# Patient Record
Sex: FEMALE | Race: BLACK OR AFRICAN AMERICAN | Employment: OTHER | URBAN - METROPOLITAN AREA
[De-identification: names, ages, dates, MRNs, and addresses within clinical notes are randomized per-mention and may not be internally consistent; named-entity substitution may affect disease eponyms.]

---

## 2019-04-10 ENCOUNTER — HOSPITAL ENCOUNTER (EMERGENCY)
Age: 24
Discharge: HOME | End: 2019-04-10
Payer: COMMERCIAL

## 2019-04-10 VITALS
OXYGEN SATURATION: 100 % | DIASTOLIC BLOOD PRESSURE: 72 MMHG | RESPIRATION RATE: 16 BRPM | SYSTOLIC BLOOD PRESSURE: 120 MMHG | HEART RATE: 78 BPM | TEMPERATURE: 98.8 F | WEIGHT: 134 LBS

## 2019-04-10 DIAGNOSIS — H11.33 SUBCONJUNCTIVAL HEMORRHAGE, BILATERAL: Primary | ICD-10-CM

## 2019-04-10 PROCEDURE — 99282 EMERGENCY DEPT VISIT SF MDM: CPT

## 2019-04-10 PROCEDURE — 6370000000 HC RX 637 (ALT 250 FOR IP): Performed by: PHYSICIAN ASSISTANT

## 2019-04-10 RX ORDER — ERYTHROMYCIN 5 MG/G
OINTMENT OPHTHALMIC
Qty: 1 TUBE | Refills: 0 | Status: SHIPPED | OUTPATIENT
Start: 2019-04-10 | End: 2020-09-17

## 2019-04-10 RX ORDER — TETRACAINE HYDROCHLORIDE 5 MG/ML
2 SOLUTION OPHTHALMIC ONCE
Status: COMPLETED | OUTPATIENT
Start: 2019-04-10 | End: 2019-04-10

## 2019-04-10 RX ADMIN — FLUORESCEIN SODIUM 1 EACH: 0.6 STRIP OPHTHALMIC at 07:50

## 2019-04-10 RX ADMIN — TETRACAINE HYDROCHLORIDE 2 DROP: 5 SOLUTION OPHTHALMIC at 07:49

## 2019-04-10 ASSESSMENT — VISUAL ACUITY
OS: 20/70
OU: 20/30
OD: 20/50

## 2019-04-10 NOTE — ED NOTES
Pt alert and oriented to person, place, time and event. Pt answering questions appropriately, in full sentences without difficulty. Pt skin color is appropriate for patient and is warm and dry. Pt is able to ambulate without difficulty to ED exit. All questions and concerns were addressed and pt verbalized understanding. Pt was educated to follow instructions on DC paperwork or to return to ED if any new concerns arise. Pt currently has no new complaints and all treatments, provider orders for this visit are completed.       Wild Thorpe RN  04/10/19 1970

## 2019-04-10 NOTE — ED PROVIDER NOTES
[address]  [address] [facility] [address]  Dept: [phone]  Loc: [phone]    eMERGENCY dEPARTMENT eNCOUnter    Evaluated by the Advanced Practice Provider    CHIEF COMPLAINT    Chief Complaint   Patient presents with    Eye Problem       HPI    Amaya Mendieta is a 30 y.o. female who presents with bilateral eye redness. Onset was 2 days ago. The duration has been constant since the onset. The quality is redness over the sclera of both eyes. The patient denies associated pain. There are no aggravating or alleviating factors. The context was a spontaneous onset. Patient states that after arriving at work in a  2 days ago a coworker told him that his eyes look red. He denies any associated injury. He states that the redness has been gradually improving since the onset. REVIEW OF SYSTEMS    Eyes: see HPI  General: No fevers or chills  GI: No nausea or vomiting  Skin: No rash    PAST MEDICAL and SURGICAL HISTORY    Past Medical History:   Diagnosis Date    Asthma     Seizures ([facility] [address]     pt states since 2013     History reviewed. No pertinent surgical history.     CURRENT MEDICATIONS  (may include discharge medications prescribed in the ED)  Current Outpatient Rx   Medication Sig Dispense Refill    lamoTRIgine (LAMICTAL) 200 MG tablet Take 200 mg by mouth daily      folic acid (FOLVITE) 1 MG tablet Take 1 mg by mouth daily      LORazepam (ATIVAN) 1 MG tablet Take 1 tablet by mouth 3 times daily as needed for Anxiety 15 tablet 0       ALLERGIES    No Known Allergies    SOCIAL and FAMILY HISTORY    Social History     Socioeconomic History    Marital status: Single     Spouse name: None    Number of children: None    Years of education: None    Highest education level: None   Occupational History    None   Social Needs    Financial resource strain: None    Food insecurity:     Worry: None     Inability: dry skin, no obvious rash  Neurologic: Awake, alert and oriented, no slurred speech    ED COURSE & MEDICAL DECISION MAKING    See electronic medical records for medications prescribed    Differential diagnosis: Iritis, Uveitis, Conjunctivitis, Herpes Keratitis, Corneal Ulcer, Corneal Abrasion, Acute Angle Glaucoma, Orbital Cellulitis/Abscess, Periorbital/Preseptal Cellulitis, other. Patient is afebrile and nontoxic in appearance. No history of injury or foreign body. Patient is not a contact lens wearer. Slit-lamp exam unremarkable. Patient has minimal conjunctival injection, however I will prescribe erythromycin ointment to cover for possible infectious etiology. The patient was instructed to follow up as an outpatient in 1 day. The patient was instructed to return to the ED immediately for any new or worsening symptoms. The patient verbalized understanding. I have evaluated this patient. My supervising physician was available for consultation. FINAL IMPRESSION    1.  Subconjunctival hemorrhage, bilateral        PLAN  Discharge with medication and followup (see EMR)      (Please note that this note was completed with a voice recognition program.  Every attempt was made to edit the dictations, but inevitably there remain words that are mis-transcribed.)          Carlito Saxena  04/10/19 0800

## 2019-12-06 ENCOUNTER — HOSPITAL ENCOUNTER (EMERGENCY)
Age: 24
Discharge: HOME | End: 2019-12-06
Attending: EMERGENCY MEDICINE

## 2019-12-06 VITALS
SYSTOLIC BLOOD PRESSURE: 115 MMHG | WEIGHT: 141.31 LBS | BODY MASS INDEX: 25.04 KG/M2 | HEART RATE: 77 BPM | OXYGEN SATURATION: 100 % | TEMPERATURE: 98.5 F | RESPIRATION RATE: 20 BRPM | HEIGHT: 63 IN | DIASTOLIC BLOOD PRESSURE: 75 MMHG

## 2019-12-06 DIAGNOSIS — R56.9 SEIZURE (HCC): Primary | ICD-10-CM

## 2019-12-06 LAB
ANION GAP SERPL CALCULATED.3IONS-SCNC: 19 MMOL/L (ref 3–16)
BASOPHILS ABSOLUTE: 0 K/UL (ref 0–0.2)
BASOPHILS RELATIVE PERCENT: 0.6 %
BILIRUBIN URINE: NEGATIVE
BLOOD, URINE: ABNORMAL
BUN BLDV-MCNC: 10 MG/DL (ref 7–20)
CALCIUM SERPL-MCNC: 9.2 MG/DL (ref 8.3–10.6)
CHLORIDE BLD-SCNC: 105 MMOL/L (ref 99–110)
CLARITY: CLEAR
CO2: 15 MMOL/L (ref 21–32)
COLOR: YELLOW
CREAT SERPL-MCNC: 0.5 MG/DL (ref 0.6–1.1)
EOSINOPHILS ABSOLUTE: 0.1 K/UL (ref 0–0.6)
EOSINOPHILS RELATIVE PERCENT: 1.4 %
EPITHELIAL CELLS, UA: 1 /HPF (ref 0–5)
GFR AFRICAN AMERICAN: >60
GFR NON-AFRICAN AMERICAN: >60
GLUCOSE BLD-MCNC: 87 MG/DL (ref 70–99)
GLUCOSE URINE: NEGATIVE MG/DL
HCG(URINE) PREGNANCY TEST: NEGATIVE
HCT VFR BLD CALC: 39.2 % (ref 36–48)
HEMOGLOBIN: 12.2 G/DL (ref 12–16)
HYALINE CASTS: 0 /LPF (ref 0–8)
KETONES, URINE: NEGATIVE MG/DL
LEUKOCYTE ESTERASE, URINE: NEGATIVE
LYMPHOCYTES ABSOLUTE: 1.5 K/UL (ref 1–5.1)
LYMPHOCYTES RELATIVE PERCENT: 35.1 %
MCH RBC QN AUTO: 25.5 PG (ref 26–34)
MCHC RBC AUTO-ENTMCNC: 31.3 G/DL (ref 31–36)
MCV RBC AUTO: 81.5 FL (ref 80–100)
MICROSCOPIC EXAMINATION: YES
MONOCYTES ABSOLUTE: 0.4 K/UL (ref 0–1.3)
MONOCYTES RELATIVE PERCENT: 10.5 %
NEUTROPHILS ABSOLUTE: 2.2 K/UL (ref 1.7–7.7)
NEUTROPHILS RELATIVE PERCENT: 52.4 %
NITRITE, URINE: NEGATIVE
PDW BLD-RTO: 15.1 % (ref 12.4–15.4)
PH UA: 7 (ref 5–8)
PLATELET # BLD: 184 K/UL (ref 135–450)
PLATELET SLIDE REVIEW: ADEQUATE
PMV BLD AUTO: 10.6 FL (ref 5–10.5)
POTASSIUM REFLEX MAGNESIUM: 4.4 MMOL/L (ref 3.5–5.1)
PROTEIN UA: 30 MG/DL
RBC # BLD: 4.8 M/UL (ref 4–5.2)
RBC UA: 244 /HPF (ref 0–4)
SLIDE REVIEW: ABNORMAL
SODIUM BLD-SCNC: 139 MMOL/L (ref 136–145)
SPECIFIC GRAVITY UA: 1.02 (ref 1–1.03)
URINE REFLEX TO CULTURE: ABNORMAL
URINE TYPE: ABNORMAL
UROBILINOGEN, URINE: 0.2 E.U./DL
WBC # BLD: 4.2 K/UL (ref 4–11)
WBC UA: 1 /HPF (ref 0–5)

## 2019-12-06 PROCEDURE — 80048 BASIC METABOLIC PNL TOTAL CA: CPT

## 2019-12-06 PROCEDURE — 84703 CHORIONIC GONADOTROPIN ASSAY: CPT

## 2019-12-06 PROCEDURE — 99284 EMERGENCY DEPT VISIT MOD MDM: CPT

## 2019-12-06 PROCEDURE — 80175 DRUG SCREEN QUAN LAMOTRIGINE: CPT

## 2019-12-06 PROCEDURE — 6370000000 HC RX 637 (ALT 250 FOR IP): Performed by: EMERGENCY MEDICINE

## 2019-12-06 PROCEDURE — 81001 URINALYSIS AUTO W/SCOPE: CPT

## 2019-12-06 PROCEDURE — 85025 COMPLETE CBC W/AUTO DIFF WBC: CPT

## 2019-12-06 RX ORDER — LAMOTRIGINE 100 MG/1
TABLET ORAL
Qty: 105 TABLET | Refills: 0 | Status: SHIPPED | OUTPATIENT
Start: 2019-12-06 | End: 2021-06-03 | Stop reason: SDUPTHER

## 2019-12-06 RX ORDER — LAMOTRIGINE 100 MG/1
200 TABLET ORAL ONCE
Status: COMPLETED | OUTPATIENT
Start: 2019-12-06 | End: 2019-12-06

## 2019-12-06 RX ADMIN — LAMOTRIGINE 200 MG: 100 TABLET ORAL at 15:11

## 2019-12-06 ASSESSMENT — PAIN SCALES - GENERAL
PAINLEVEL_OUTOF10: 5
PAINLEVEL_OUTOF10: 8

## 2019-12-06 ASSESSMENT — PAIN DESCRIPTION - DESCRIPTORS
DESCRIPTORS: HEADACHE
DESCRIPTORS: HEADACHE

## 2019-12-06 ASSESSMENT — PAIN DESCRIPTION - PAIN TYPE
TYPE: ACUTE PAIN
TYPE: ACUTE PAIN

## 2019-12-06 ASSESSMENT — PAIN DESCRIPTION - FREQUENCY: FREQUENCY: CONTINUOUS

## 2019-12-06 ASSESSMENT — PAIN DESCRIPTION - ONSET: ONSET: SUDDEN

## 2019-12-06 ASSESSMENT — PAIN DESCRIPTION - LOCATION
LOCATION: HEAD
LOCATION: HEAD

## 2019-12-09 LAB — LAMOTRIGINE LEVEL: <0.9 UG/ML (ref 2.5–15)

## 2020-08-24 ENCOUNTER — HOSPITAL ENCOUNTER (EMERGENCY)
Age: 25
Discharge: HOME | End: 2020-08-24
Payer: MEDICARE

## 2020-08-24 VITALS
RESPIRATION RATE: 16 BRPM | HEIGHT: 60 IN | TEMPERATURE: 98 F | WEIGHT: 126.54 LBS | BODY MASS INDEX: 24.84 KG/M2 | SYSTOLIC BLOOD PRESSURE: 121 MMHG | OXYGEN SATURATION: 100 % | DIASTOLIC BLOOD PRESSURE: 78 MMHG | HEART RATE: 66 BPM

## 2020-08-24 LAB
ANION GAP SERPL CALCULATED.3IONS-SCNC: 12 MMOL/L (ref 3–16)
BASOPHILS ABSOLUTE: 0 K/UL (ref 0–0.2)
BASOPHILS RELATIVE PERCENT: 0.4 %
BUN BLDV-MCNC: 8 MG/DL (ref 7–20)
CALCIUM SERPL-MCNC: 9 MG/DL (ref 8.3–10.6)
CHLORIDE BLD-SCNC: 103 MMOL/L (ref 99–110)
CO2: 22 MMOL/L (ref 21–32)
CREAT SERPL-MCNC: 0.6 MG/DL (ref 0.6–1.1)
EOSINOPHILS ABSOLUTE: 0 K/UL (ref 0–0.6)
EOSINOPHILS RELATIVE PERCENT: 0.8 %
GFR AFRICAN AMERICAN: >60
GFR NON-AFRICAN AMERICAN: >60
GLUCOSE BLD-MCNC: 74 MG/DL (ref 70–99)
HCG QUALITATIVE: NEGATIVE
HCT VFR BLD CALC: 35.3 % (ref 36–48)
HEMOGLOBIN: 11.3 G/DL (ref 12–16)
LYMPHOCYTES ABSOLUTE: 1.4 K/UL (ref 1–5.1)
LYMPHOCYTES RELATIVE PERCENT: 29 %
MCH RBC QN AUTO: 25.1 PG (ref 26–34)
MCHC RBC AUTO-ENTMCNC: 32 G/DL (ref 31–36)
MCV RBC AUTO: 78.3 FL (ref 80–100)
MONOCYTES ABSOLUTE: 0.5 K/UL (ref 0–1.3)
MONOCYTES RELATIVE PERCENT: 11.1 %
NEUTROPHILS ABSOLUTE: 2.9 K/UL (ref 1.7–7.7)
NEUTROPHILS RELATIVE PERCENT: 58.7 %
PDW BLD-RTO: 16.2 % (ref 12.4–15.4)
PLATELET # BLD: 214 K/UL (ref 135–450)
PMV BLD AUTO: 9.8 FL (ref 5–10.5)
POTASSIUM REFLEX MAGNESIUM: 4 MMOL/L (ref 3.5–5.1)
RBC # BLD: 4.51 M/UL (ref 4–5.2)
SODIUM BLD-SCNC: 137 MMOL/L (ref 136–145)
WBC # BLD: 4.9 K/UL (ref 4–11)

## 2020-08-24 PROCEDURE — 80048 BASIC METABOLIC PNL TOTAL CA: CPT

## 2020-08-24 PROCEDURE — 99283 EMERGENCY DEPT VISIT LOW MDM: CPT

## 2020-08-24 PROCEDURE — 84703 CHORIONIC GONADOTROPIN ASSAY: CPT

## 2020-08-24 PROCEDURE — 85025 COMPLETE CBC W/AUTO DIFF WBC: CPT

## 2020-08-24 ASSESSMENT — ENCOUNTER SYMPTOMS
RESPIRATORY NEGATIVE: 1
GASTROINTESTINAL NEGATIVE: 1
BACK PAIN: 0

## 2020-08-24 NOTE — ED NOTES
D/C: Order noted for d/c. Pt confirmed d/c paperwork does have correct name. Discharge and education instructions reviewed with patient. Teach-back successful. Pt verbalized understanding and signed d/c papers. Pt denied questions at this time. No acute distress noted. Patient instructed to follow-up as noted - return to emergency department if symptoms worsen. Patient verbalized understanding. Discharged per EDMD with discharge instructions. Pt discharged to private vehicle. Patient stable upon departure. Thanked patient for choosing [facility] for care.      Kristal Zhou RN  08/24/20 9452

## 2020-08-24 NOTE — ED PROVIDER NOTES
[address] [facility]        Pt Name: Clifford Vega  MRN: [id number]  [place] 1995  Date of evaluation: 8/24/2020  Provider: VELASQUEZ Arrieta CNP  PCP: [facility]    Evaluation by SHANDA. My supervising physician was available for consultation. CHIEF COMPLAINT       Chief Complaint   Patient presents with    Other     pt with history of epilepsy. pt states her knees are \"jumping\". pt has been off her medicine recently but now has medication refilled. pt needs note for work       HISTORY OF PRESENT ILLNESS   (Location, Timing/Onset, Context/Setting, Quality, Duration, Modifying Factors, Severity, Associated Signs and Symptoms)  Note limiting factors. Clifford Vega is a 30 y.o. female that presents to the ED today with a vague complaint of knee spasms and weakness at the end of her work day. She states that she has not fallen. No knee pain or injury. States that at the end of her work shift, is a  at the [employer], she has some weakness in her knees and spasms. She states that it has caused her to miss work multiple times. She has no complaints at this time. This is been ongoing for several months, greater than 2 months at least according to the patient. Patient again has no complaints at this time. She came to the ED for mainly a work note. Nursing Notes were all reviewed and agreed with or any disagreements were addressed in the HPI. REVIEW OF SYSTEMS    (2-9 systems for level 4, 10 or more for level 5)     Review of Systems   Constitutional: Negative. Negative for chills, fatigue and fever. HENT: Negative. Respiratory: Negative. Cardiovascular: Negative. Gastrointestinal: Negative. Genitourinary: Negative. Musculoskeletal: Negative. Negative for arthralgias, back pain, gait problem, joint swelling and myalgias. Skin: Negative.     Allergic/Immunologic: Negative for immunocompromised state. Neurological: Negative. Psychiatric/Behavioral: Negative. All other systems reviewed and are negative. Positives and Pertinent negatives as per HPI. Except as noted above in the ROS, all other systems were reviewed and negative. PAST MEDICAL HISTORY     Past Medical History:   Diagnosis Date    Asthma     Seizures ([address]     pt states since 2013         SURGICAL HISTORY   History reviewed. No pertinent surgical history. CURRENTMEDICATIONS       Previous Medications    ERYTHROMYCIN (ROMYCIN) 5 MG/GM OPHTHALMIC OINTMENT    Apply to affected eye(s) 4 times a day for 7 days. Squeeze out a ribbon of ointment 1/2-inch long into the lower eyelid pocket without touching the tip of the tube to your eye. Look down and close your eyes for a few minutes. FOLIC ACID (FOLVITE) 1 MG TABLET    Take 1 mg by mouth daily    LAMOTRIGINE (LAMICTAL) 100 MG TABLET    Take 1.5 tablets by mouth every morning AND 2 tablets nightly. LORAZEPAM (ATIVAN) 1 MG TABLET    Take 1 tablet by mouth 3 times daily as needed for Anxiety         ALLERGIES     Patient has no known allergies. FAMILYHISTORY     History reviewed. No pertinent family history. SOCIAL HISTORY       Social History     Tobacco Use    Smoking status: Former Smoker     Types: Cigars    Smokeless tobacco: Never Used   Substance Use Topics    Alcohol use: No    Drug use: Yes     Types: Marijuana       SCREENINGS             PHYSICAL EXAM    (up to 7 for level 4, 8 or more for level 5)     ED Triage Vitals   BP Temp Temp Source Pulse Resp SpO2 Height Weight   08/24/20 1511 08/24/20 1511 08/24/20 1511 08/24/20 1511 08/24/20 1511 08/24/20 1511 08/24/20 1500 08/24/20 1500   127/75 97.9 °F (36.6 °C) Oral 69 17 100 % 5' (1.524 m) 126 lb 8.7 oz (57.4 kg)       Physical Exam  Vitals signs and nursing note reviewed. Constitutional:       General: She is not in acute distress. Appearance: Normal appearance.  She is well-developed. She is not ill-appearing, toxic-appearing or diaphoretic. Interventions: She is not intubated. HENT:      Head: Normocephalic and atraumatic. Eyes:      General:         Right eye: No discharge. Left eye: No discharge. Conjunctiva/sclera: Conjunctivae normal.   Neck:      Musculoskeletal: Full passive range of motion without pain, normal range of motion and neck supple. Vascular: No JVD. Trachea: No tracheal deviation. Cardiovascular:      Rate and Rhythm: Normal rate and regular rhythm. No extrasystoles are present. Chest Wall: PMI is not displaced. No thrill. Pulses: Normal pulses. No decreased pulses. Heart sounds: Normal heart sounds, S1 normal and S2 normal. Heart sounds not distant. No murmur. No friction rub. No gallop. Pulmonary:      Effort: Pulmonary effort is normal. No tachypnea, bradypnea, accessory muscle usage, prolonged expiration, respiratory distress or retractions. She is not intubated. Breath sounds: Normal breath sounds and air entry. No stridor, decreased air movement or transmitted upper airway sounds. No decreased breath sounds, wheezing, rhonchi or rales. Chest:      Chest wall: No mass, lacerations, deformity, swelling, tenderness, crepitus or edema. Abdominal:      General: Bowel sounds are normal. There is no distension or abdominal bruit. Palpations: Abdomen is soft. Abdomen is not rigid. There is no mass or pulsatile mass. Tenderness: There is no abdominal tenderness. There is no guarding or rebound. Negative signs include Nevarez's sign and McBurney's sign. Hernia: No hernia is present. Musculoskeletal: Normal range of motion. General: No tenderness or deformity. Right lower leg: No edema. Left lower leg: No edema. Lymphadenopathy:      Cervical: No cervical adenopathy. Skin:     General: Skin is warm and dry. Capillary Refill: Capillary refill takes 2 to 3 seconds. Coloration: Skin is not pale. Findings: No erythema or rash. Neurological:      General: No focal deficit present. Mental Status: She is alert and oriented to person, place, and time. She is not disoriented. GCS: GCS eye subscore is 4. GCS verbal subscore is 5. GCS motor subscore is 6. Cranial Nerves: No cranial nerve deficit, dysarthria or facial asymmetry. Sensory: No sensory deficit. Psychiatric:         Attention and Perception: Attention and perception normal.         Mood and Affect: Mood and affect normal.         Speech: Speech normal.         Behavior: Behavior normal. Behavior is cooperative. Thought Content: Thought content normal.         Cognition and Memory: Cognition and memory normal.         Judgment: Judgment normal.         DIAGNOSTIC RESULTS   LABS:    Labs Reviewed   CBC WITH AUTO DIFFERENTIAL - Abnormal; Notable for the following components:       Result Value    Hemoglobin 11.3 (*)     Hematocrit 35.3 (*)     MCV 78.3 (*)     MCH 25.1 (*)     RDW 16.2 (*)     All other components within normal limits    Narrative:     Performed at:  [address] [address]   Phone [phone]   BASIC METABOLIC PANEL W/ REFLEX TO MG FOR LOW K    Narrative:     Performed at:  [address] [address]   Phone [phone]   HCG, SERUM, QUALITATIVE    Narrative:     Performed at:  [address] [address]   Phone [phone]       All other labs were within normal range or not returned as of this dictation. EKG: All EKG's are interpreted by the Emergency Department Physician in the absence of a cardiologist.  Please see their note for interpretation of EKG.       RADIOLOGY:   Non-plain film images such as CT, Ultrasound and MRI are read by the radiologist. Plain radiographic images are visualized and preliminarily interpreted by the ED Provider with the below findings:        Interpretation per the Radiologist below, if available at the time of this note:    No orders to display     No results found. PROCEDURES   Unless otherwise noted below, none     Procedures    CRITICAL CARE TIME   N/A    CONSULTS:  None      EMERGENCY DEPARTMENT COURSE and DIFFERENTIAL DIAGNOSIS/MDM:   Vitals:    Vitals:    08/24/20 1500 08/24/20 1511   BP:  127/75   Pulse:  69   Resp:  17   Temp:  97.9 °F (36.6 °C)   TempSrc:  Oral   SpO2:  100%   Weight: 126 lb 8.7 oz (57.4 kg)    Height: 5' (1.524 m)        Patient was given the following medications:  Medications - No data to display        MDM: Patient seen and evaluated per myself. ED attending was available for consultation as needed. Patient is a 64-year-old female the presents to the ED today with vague complaints of ongoing bilateral knee weakness and spasming at the end of her work shifts for several months now. She is afebrile and hemodynamically stable. No complaints at this time, benign physical exam, see full physical exam as above. Differential diagnoses include generalized weakness, musculoskeletal fatigue, electrolyte derangement, pregnancy, medication side effect, other    As there is been no specific injury or trauma event or bony abnormality I do not believe that imaging is warranted at this point time. This appears to be a chronic issue, she will need follow-up with primary care and at this time is requesting a referral for primary care and neurology. Blood work shows no leukocytosis, stable anemia. No severe electrolyte derangements or JAMILA. hCG qualitative negative. She seems to have a chronic issue of what she describes as knee and leg spasms after working long shifts. I have a low suspicion for any emergent concerns.   She does need to follow-up with PCP, she does not currently have one and then 4 we will give her an urgent no PCP referral.  She is also requesting a referral back to [facility] neurology as she was dismissed from their practice due to noncompliance and no-shows to her appointments. I spoke to the patient stating that I will give her a referral but they did may not take her back as the patient given her history, therefore we will give her a referral to [facility] neurology for follow-up regarding her epilepsy. She is in agreement with this as well as the plan of discharge. Remained afebrile and hemodynamically stable throughout her entire ED course will be discharged home in stable condition. I estimate there is LOW risk for COMPARTMENT SYNDROME, DEEP VENOUS THROMBOSIS, SEPTIC ARTHRITIS, TENDON OR NEUROVASCULAR INJURY, thus I consider the discharge disposition reasonable. Makayla Sim and I have discussed the diagnosis and risks, and we agree with discharging home to follow-up with their primary doctor or the referral orthopedist. We also discussed returning to the Emergency Department immediately if new or worsening symptoms occur. We have discussed the symptoms which are most concerning (e.g., changing or worsening pain, numbness, weakness) that necessitate immediate return. Blood pressure 127/75, pulse 69, temperature 97.9 °F (36.6 °C), temperature source Oral, resp. rate 17, height 5' (1.524 m), weight 126 lb 8.7 oz (57.4 kg), SpO2 100 %. FINAL IMPRESSION      1.  Muscle spasm of both lower legs          DISPOSITION/PLAN   DISPOSITION Decision To Discharge 08/24/2020 05:31:52 PM      PATIENT REFERREDTO:  Christopher Bradciarra  [phone]  Schedule an appointment as soon as possible for a visit in 2 days      Neurology  [facility] Neurology at [address], [address] [place] [address]   [phone]  Schedule an appointment as soon as possible for a visit in 2 days      *[facility]  [address] [address]  [phone]    Schedule an appointment as soon as possible for a visit in 2 days  if UC will no longer take you as a patient d/t [facility] Emergency Department  [address] [address]  [phone]  Go to   As needed, If symptoms worsen      DISCHARGE MEDICATIONS:  New Prescriptions    No medications on file       DISCONTINUED MEDICATIONS:  Discontinued Medications    No medications on file              (Please note that portions of this note were completed with a voice recognition program.  Efforts were made to edit the dictations but occasionally words are mis-transcribed.)    VELASQUEZ Austin CNP (electronically signed)            VELASQUEZ Austin CNP  08/24/20 0959

## 2020-09-12 ENCOUNTER — HOSPITAL ENCOUNTER (EMERGENCY)
Age: 25
Discharge: HOME | End: 2020-09-12
Payer: MEDICARE

## 2020-09-12 ENCOUNTER — APPOINTMENT (OUTPATIENT)
Dept: GENERAL RADIOLOGY | Age: 25
End: 2020-09-12
Payer: MEDICARE

## 2020-09-12 VITALS
SYSTOLIC BLOOD PRESSURE: 118 MMHG | BODY MASS INDEX: 22.32 KG/M2 | OXYGEN SATURATION: 100 % | RESPIRATION RATE: 14 BRPM | WEIGHT: 126 LBS | DIASTOLIC BLOOD PRESSURE: 80 MMHG | TEMPERATURE: 97.8 F | HEART RATE: 77 BPM | HEIGHT: 63 IN

## 2020-09-12 PROCEDURE — 99283 EMERGENCY DEPT VISIT LOW MDM: CPT

## 2020-09-12 PROCEDURE — 73502 X-RAY EXAM HIP UNI 2-3 VIEWS: CPT

## 2020-09-12 PROCEDURE — 6370000000 HC RX 637 (ALT 250 FOR IP): Performed by: PHYSICIAN ASSISTANT

## 2020-09-12 RX ORDER — ACETAMINOPHEN 500 MG
1000 TABLET ORAL ONCE
Status: COMPLETED | OUTPATIENT
Start: 2020-09-12 | End: 2020-09-12

## 2020-09-12 RX ORDER — METHOCARBAMOL 500 MG/1
500 TABLET, FILM COATED ORAL 3 TIMES DAILY PRN
Qty: 20 TABLET | Refills: 0 | Status: SHIPPED | OUTPATIENT
Start: 2020-09-12 | End: 2020-09-22

## 2020-09-12 RX ORDER — LIDOCAINE 4 G/G
1 PATCH TOPICAL DAILY
Status: DISCONTINUED | OUTPATIENT
Start: 2020-09-12 | End: 2020-09-12 | Stop reason: HOSPADM

## 2020-09-12 RX ADMIN — ACETAMINOPHEN 1000 MG: 500 TABLET ORAL at 19:07

## 2020-09-12 ASSESSMENT — PAIN SCALES - GENERAL: PAINLEVEL_OUTOF10: 9

## 2020-09-12 NOTE — ED NOTES
Bed: B22-22  Expected date:   Expected time:   Means of arrival:   Comments:  Nish Barroso RN  09/12/20 7963
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
## 2020-09-12 NOTE — LETTER
[facility] Emergency Department  [place] [address]  Phone: [phone]  Fax: [phone]         September 12, 2020     Patient: Krysat Costa   YOB: 1995   Date of Visit: 9/12/2020       To Whom It May Concern:    Krysta Costa was seen and treated in our emergency department on 9/12/2020. The following work duties are recommended. She may return to work on 9/15/20    Treatment and work recommendations given by the emergency department are initial emergency measures only. May follow-up with primary care physician as an outpatient as needed.       Sincerely,        Wilson Hernandez PA-C        Signature:__________________________________

## 2020-09-12 NOTE — ED NOTES
RN to the bedside to medicate and discharge, pt states has not spoken with KIERRA peña to speak with her MD      Chris Shrestha, MELVA  09/12/20 3640

## 2020-09-17 ENCOUNTER — HOSPITAL ENCOUNTER (EMERGENCY)
Age: 25
Discharge: HOME | End: 2020-09-17
Payer: MEDICARE

## 2020-09-17 VITALS
DIASTOLIC BLOOD PRESSURE: 61 MMHG | RESPIRATION RATE: 16 BRPM | HEART RATE: 70 BPM | OXYGEN SATURATION: 100 % | SYSTOLIC BLOOD PRESSURE: 104 MMHG | TEMPERATURE: 98.3 F

## 2020-09-17 PROCEDURE — 99282 EMERGENCY DEPT VISIT SF MDM: CPT

## 2020-09-17 RX ORDER — CLINDAMYCIN PALMITATE HYDROCHLORIDE 75 MG/5ML
450 SOLUTION ORAL 3 TIMES DAILY
Qty: 900 ML | Refills: 0 | Status: SHIPPED | OUTPATIENT
Start: 2020-09-17 | End: 2020-09-27

## 2020-09-17 RX ORDER — ERYTHROMYCIN 5 MG/G
OINTMENT OPHTHALMIC
Qty: 1 TUBE | Refills: 0 | Status: SHIPPED | OUTPATIENT
Start: 2020-09-17 | End: 2022-10-07

## 2020-09-17 ASSESSMENT — PAIN DESCRIPTION - DESCRIPTORS: DESCRIPTORS: THROBBING

## 2020-09-17 ASSESSMENT — VISUAL ACUITY
OU: 20/40
OU: 1
OS: 20/50
OD: 20/70

## 2020-09-17 ASSESSMENT — PAIN DESCRIPTION - ONSET: ONSET: ON-GOING

## 2020-09-17 ASSESSMENT — ENCOUNTER SYMPTOMS
EYE PAIN: 1
EYE REDNESS: 0
PHOTOPHOBIA: 0
EYE DISCHARGE: 1

## 2020-09-17 ASSESSMENT — PAIN DESCRIPTION - PROGRESSION: CLINICAL_PROGRESSION: NOT CHANGED

## 2020-09-17 ASSESSMENT — PAIN DESCRIPTION - PAIN TYPE: TYPE: ACUTE PAIN

## 2020-09-17 ASSESSMENT — PAIN DESCRIPTION - LOCATION: LOCATION: EYE

## 2020-09-17 ASSESSMENT — PAIN DESCRIPTION - ORIENTATION: ORIENTATION: RIGHT

## 2020-09-17 ASSESSMENT — PAIN - FUNCTIONAL ASSESSMENT: PAIN_FUNCTIONAL_ASSESSMENT: PREVENTS OR INTERFERES SOME ACTIVE ACTIVITIES AND ADLS

## 2020-09-17 ASSESSMENT — PAIN SCALES - GENERAL: PAINLEVEL_OUTOF10: 9

## 2020-09-17 ASSESSMENT — PAIN DESCRIPTION - FREQUENCY: FREQUENCY: INTERMITTENT

## 2020-09-17 NOTE — ED PROVIDER NOTES
[address]      Pt Name: Reba Crawley  MRN: [id number]  [place] 1995  Date of evaluation: 9/17/2020  Provider: Devika Treviño PA-C    This patient was not seen and evaluated by the attending physician No att. providers found. CHIEF COMPLAINT       Chief Complaint   Patient presents with    Eye Problem     right eye, states a sye and crusty with pain x 3 days         HISTORYOF PRESENT ILLNESS  (Location/Symptom, Timing/Onset, Context/Setting, Quality, Duration, Modifying Factors, Severity.)   Reba Crawley is a 30 y.o. female who presents to the emergency department with right eye crusting and eyelid swelling. She started with a stye on the upper eyelid 3 days ago. She tried cool compresses without relief. Since then upper eyelid has become more swollen and painful and she has had some greenish drainage. Reports trouble opening her eye. No pain with extraocular movements. No blurred vision photophobia. She is supposed to wear glasses does not have them. Nursing Notes were reviewedand I agree. REVIEW OF SYSTEMS    (2-9 systems for level 4, 10 or more forlevel 5)     Review of Systems   Constitutional: Negative for fever. Eyes: Positive for pain and discharge. Negative for photophobia, redness and visual disturbance. Neurological: Negative for headaches. Except as noted above the remainder ofthe review of systems was reviewed and negative. PAST MEDICALHISTORY         Diagnosis Date    Asthma     Seizures ([facility] [address]     pt states since 2013       SURGICAL HISTORY     History reviewed. No pertinent surgical history.     CURRENT MEDICATIONS       Discharge Medication List as of 9/17/2020  3:38 PM      CONTINUE these medications which have NOT CHANGED    Details   methocarbamol (ROBAXIN) 500 MG tablet Take 1 tablet by mouth 3 times daily as needed (muscle spasms), Disp-20 tablet,R-0Print      lamoTRIgine (LAMICTAL) 100 MG tablet Take 1.5 tablets by mouth every morning AND 2 tablets nightly., Disp-105 tablet, [id number]      folic acid (FOLVITE) 1 MG tablet Take 1 mg by mouth daily      LORazepam (ATIVAN) 1 MG tablet Take 1 tablet by mouth 3 times daily as needed for Anxiety, Disp-15 tablet, R-0             ALLERGIES     Patient has no known allergies. FAMILY HISTORY     History reviewed. No pertinent family history. No family status information on file. SOCIAL HISTORY    reports that she has quit smoking. Her smoking use included cigars. She has never used smokeless tobacco. She reports current drug use. Drug: Marijuana. She reports that she does not drink alcohol. PHYSICAL EXAM    (up to 7 for level 4, 8 or more for level 5)     ED Triage Vitals [09/17/20 1425]   BP Temp Temp Source Pulse Resp SpO2 Height Weight   (!) 104/55 98.3 °F (36.8 °C) Oral 69 14 100 % -- --       Physical Exam  Vitals signs and nursing note reviewed. Constitutional:       General: She is not in acute distress. Appearance: She is well-developed. HENT:      Head: Normocephalic and atraumatic. Eyes:      General: Vision grossly intact. Gaze aligned appropriately. Right eye: Discharge (greenish) and hordeolum (left upper) present. No foreign body. Left eye: No foreign body. Extraocular Movements:      Right eye: Normal extraocular motion. Left eye: Normal extraocular motion. Conjunctiva/sclera:      Right eye: Right conjunctiva is not injected. Left eye: Left conjunctiva is not injected. Comments: The left upper eyelid is edematous and tender. No periorbital erythema or swelling. No pain on EOM's. No proptosis. Neck:      Musculoskeletal: Neck supple. Pulmonary:      Effort: Pulmonary effort is normal. No respiratory distress. Musculoskeletal: Normal range of motion. Skin:     General: Skin is warm and dry.    Neurological:      Mental Status: She is alert and oriented to person, place, and time.   Psychiatric:         Behavior: Behavior normal.            EMERGENCY DEPARTMENT COURSE and DIFFERENTIAL DIAGNOSIS/MDM:   Vitals:    Vitals:    09/17/20 1425 09/17/20 1545   BP: (!) 104/55 104/61   Pulse: 69 70   Resp: 14 16   Temp: 98.3 °F (36.8 °C) 98.3 °F (36.8 °C)   TempSrc: Oral Oral   SpO2: 100% 100%        I have evaluated this patient. My supervising physician was available for consultation. The patient has a stye but the upper eyelid is starting to get swollen and she may be developing an early periorbital cellulitis. I have no clinical concern for orbital cellulitis. She has no pain on extraocular movements, no proptosis. I recommended warm compresses, Romycin ophthalmic ointment and will prescribe clindamycin. The patient states she cannot take pills would like the antibiotic to be a liquid if possible. Discussed results, diagnosis and plan with patient and/or family. Questions addressed. Dispositionand follow-up agreed upon. Specific discharge instructions explained. The patient and/or family and I have discussed the diagnosis and risks, and we agree with discharging home to follow-up with their primary care,specialist or referral doctor. We also discussed returning to the Emergency Department immediately if new or worsening symptoms occur. We have discussed the symptoms which are most concerning that necessitate immediatereturn. PROCEDURES:  None    FINAL IMPRESSION      1. Hordeolum externum of right upper eyelid    2.  Pain and swelling of eyelid          DISPOSITION/PLAN   DISPOSITION Decision To Discharge 09/17/2020 03:13:04 PM      PATIENT REFERRED TO:  [address] [place] [address] Street    Schedule an appointment as soon as possible for a visit in 1 day        MEDICATIONS:  Discharge Medication List as of 9/17/2020  3:38 PM      START taking these medications    Details   clindamycin (CLEOCIN) 75 MG/5ML solution Take 30 mLs by mouth 3 times daily for 10 days, Disp-900 mL,R-0Print             (Please note that portions of this note were completed with a voice recognition program.  Efforts were made toedit the dictations but occasionally words are mis-transcribed.)    NUSRAT Murillo PA-C  09/17/20 1627

## 2020-12-06 ENCOUNTER — HOSPITAL ENCOUNTER (EMERGENCY)
Age: 25
Discharge: HOME | End: 2020-12-06
Payer: MEDICARE

## 2020-12-06 ENCOUNTER — APPOINTMENT (OUTPATIENT)
Dept: GENERAL RADIOLOGY | Age: 25
End: 2020-12-06
Payer: MEDICARE

## 2020-12-06 VITALS
RESPIRATION RATE: 14 BRPM | TEMPERATURE: 98.1 F | SYSTOLIC BLOOD PRESSURE: 122 MMHG | DIASTOLIC BLOOD PRESSURE: 74 MMHG | BODY MASS INDEX: 24.02 KG/M2 | HEART RATE: 70 BPM | OXYGEN SATURATION: 99 % | WEIGHT: 135.58 LBS

## 2020-12-06 LAB
BACTERIA: ABNORMAL /HPF
BILIRUBIN URINE: ABNORMAL
BLOOD, URINE: NEGATIVE
CLARITY: CLEAR
COLOR: YELLOW
EPITHELIAL CELLS, UA: ABNORMAL /HPF (ref 0–5)
GLUCOSE URINE: NEGATIVE MG/DL
HCG(URINE) PREGNANCY TEST: NEGATIVE
KETONES, URINE: NEGATIVE MG/DL
LEUKOCYTE ESTERASE, URINE: NEGATIVE
MICROSCOPIC EXAMINATION: YES
MUCUS: ABNORMAL /LPF
NITRITE, URINE: NEGATIVE
PH UA: 6 (ref 5–8)
PROTEIN UA: ABNORMAL MG/DL
RAPID INFLUENZA  B AGN: NEGATIVE
RAPID INFLUENZA A AGN: NEGATIVE
RBC UA: ABNORMAL /HPF (ref 0–4)
RENAL EPITHELIAL, UA: ABNORMAL /HPF (ref 0–1)
SPECIFIC GRAVITY UA: 1.03 (ref 1–1.03)
URINE REFLEX TO CULTURE: ABNORMAL
URINE TYPE: ABNORMAL
UROBILINOGEN, URINE: 0.2 E.U./DL
WBC UA: ABNORMAL /HPF (ref 0–5)

## 2020-12-06 PROCEDURE — 81001 URINALYSIS AUTO W/SCOPE: CPT

## 2020-12-06 PROCEDURE — 99283 EMERGENCY DEPT VISIT LOW MDM: CPT

## 2020-12-06 PROCEDURE — U0003 INFECTIOUS AGENT DETECTION BY NUCLEIC ACID (DNA OR RNA); SEVERE ACUTE RESPIRATORY SYNDROME CORONAVIRUS 2 (SARS-COV-2) (CORONAVIRUS DISEASE [COVID-19]), AMPLIFIED PROBE TECHNIQUE, MAKING USE OF HIGH THROUGHPUT TECHNOLOGIES AS DESCRIBED BY CMS-2020-01-R: HCPCS

## 2020-12-06 PROCEDURE — U0002 COVID-19 LAB TEST NON-CDC: HCPCS

## 2020-12-06 PROCEDURE — 71045 X-RAY EXAM CHEST 1 VIEW: CPT

## 2020-12-06 PROCEDURE — 87804 INFLUENZA ASSAY W/OPTIC: CPT

## 2020-12-06 PROCEDURE — 84703 CHORIONIC GONADOTROPIN ASSAY: CPT

## 2020-12-06 RX ORDER — FLUTICASONE PROPIONATE 50 MCG
1 SPRAY, SUSPENSION (ML) NASAL DAILY
Qty: 1 BOTTLE | Refills: 0 | Status: SHIPPED | OUTPATIENT
Start: 2020-12-06

## 2020-12-06 ASSESSMENT — PAIN SCALES - GENERAL
PAINLEVEL_OUTOF10: 0
PAINLEVEL_OUTOF10: 0

## 2020-12-06 NOTE — ED PROVIDER NOTES
[address] [facility]  [address] [address]  Dept: [phone]  Loc: [address] ENCOUNTER        This patient was not seen or evaluated by the attending physician. I evaluated this patient, the attending physician was available for consultation. CHIEF COMPLAINT    Chief Complaint   Patient presents with    Covid Testing     stuff nose       HPI    Lorrie Bullard is a 30 y.o. female who presents with nasal congestion with no other associated symptoms. Onset was 2 days ago. Sister has very similar symptoms, and her sister works at a school/. The duration has been constant since the onset. There are no alleviating factors. She denies any pain, a or generalized body aches. Denies any fevers or chills. Denies being diabetic or immunocompromise. Came to the ED for further evaluation and treatment. REVIEW OF SYSTEMS    Pulmonary: no sore throat, see HPI, no hemoptysis  General: no fever, no myalgia  GI: no nausea, no vomiting  All other systems reviewed and are negative. PAST MEDICAL AND SURGICAL HISTORY    Past Medical History:   Diagnosis Date    Asthma     Seizures ([facility] [address]     pt states since 2013     History reviewed. No pertinent surgical history. CURRENT MEDICATIONS  (may include discharge medications prescribed in the ED)  Current Outpatient Rx   Medication Sig Dispense Refill    fluticasone (FLONASE) 50 MCG/ACT nasal spray 1 spray by Each Nostril route daily 1 Bottle 0    erythromycin (ROMYCIN) 5 MG/GM ophthalmic ointment Apply to the lower eyelid margin 4 times daily for 5 days. 1 Tube 0    lamoTRIgine (LAMICTAL) 100 MG tablet Take 1.5 tablets by mouth every morning AND 2 tablets nightly.  256 tablet 0    folic acid (FOLVITE) 1 MG tablet Take 1 mg by mouth daily      LORazepam (ATIVAN) 1 MG tablet Take 1 tablet by mouth 3 times daily as needed for Anxiety 15 tablet 0 ALLERGIES    No Known Allergies    FAMILY AND SOCIAL HISTORY    History reviewed. No pertinent family history.   Social History     Socioeconomic History    Marital status: Single     Spouse name: None    Number of children: None    Years of education: None    Highest education level: None   Occupational History    None   Social Needs    Financial resource strain: None    Food insecurity     Worry: None     Inability: None    Transportation needs     Medical: None     Non-medical: None   Tobacco Use    Smoking status: Former Smoker     Types: Cigars    Smokeless tobacco: Never Used   Substance and Sexual Activity    Alcohol use: No    Drug use: Yes     Types: Marijuana     Comment: daily    Sexual activity: None   Lifestyle    Physical activity     Days per week: None     Minutes per session: None    Stress: None   Relationships    Social connections     Talks on phone: None     Gets together: None     Attends [employer] service: None     Active member of club or organization: None     Attends meetings of clubs or organizations: None     Relationship status: None    Intimate partner violence     Fear of current or ex partner: None     Emotionally abused: None     Physically abused: None     Forced sexual activity: None   Other Topics Concern    None   Social History Narrative    None       PHYSICAL EXAM    VITAL SIGNS: /78   Pulse 74   Temp 97.7 °F (36.5 °C) (Oral)   Resp 16   Wt 135 lb 9.3 oz (61.5 kg)   LMP 12/02/2020   SpO2 99%   BMI 24.02 kg/m²   Constitutional:  Well developed, well nourished, no acute distress  Eyes: Sclera nonicteric, conjunctiva normal   Ears: external ears normal  Neck: Supple, no meningismus   Respiratory:  Lungs clear to auscultation bilaterally, no retractions  Cardiovascular: regular rate, regular rhythm, no murmurs, rubs or gallops  GI: soft, nontender, nondistended, BS x4  Neurologic: Awake, alert and oriented, no slurred speech  Integument: Skin is warm and dry, no rash, moist mucus membranes      RADIOLOGY/PROCEDURES    XR CHEST PORTABLE   Final Result   No evidence of pneumonia               LABS  Results for orders placed or performed during the hospital encounter of 12/06/20   Rapid influenza A/B antigens    Specimen: Nasopharyngeal   Result Value Ref Range    Rapid Influenza A Ag Negative Negative    Rapid Influenza B Ag Negative Negative   Urinalysis Reflex to Culture    Specimen: Urine, clean catch   Result Value Ref Range    Color, UA YELLOW Straw/Yellow    Clarity, UA Clear Clear    Glucose, Ur Negative Negative mg/dL    Bilirubin Urine SMALL (A) Negative    Ketones, Urine Negative Negative mg/dL    Specific Gravity, UA 1.028 1.005 - 1.030    Blood, Urine Negative Negative    pH, UA 6.0 5.0 - 8.0    Protein, UA TRACE (A) Negative mg/dL    Urobilinogen, Urine 0.2 <2.0 E.U./dL    Nitrite, Urine Negative Negative    Leukocyte Esterase, Urine Negative Negative    Microscopic Examination YES     Urine Type Other    Pregnancy, Urine   Result Value Ref Range    HCG(Urine) Pregnancy Test Negative Detects HCG level >20 MIU/mL         ED COURSE & MEDICAL DECISION MAKING    See chart for details of medications given. Vitals:    12/06/20 1532 12/06/20 1534 12/06/20 1535   BP:  116/78    Pulse: 74     Resp: 16     Temp: 97.7 °F (36.5 °C)     TempSrc: Oral     SpO2: 99%     Weight:   135 lb 9.3 oz (61.5 kg)           Differential diagnoses: Influenza, Other viral illness, Meningitis, Group A strep, Airway Obstruction, Pneumonia, Hypoxemia, Dehydration, COVID-19, other. Patient presenting with complaints of nasal congestion, no fevers or chills. Is afebrile, hemodynamically stable and nontoxic in appearance here in the ED. Rapid influenza swab negative. Chest x-ray as above. Lungs clear, patient is satting well on room air. Chest x-ray is read by the radiologist as above.     Patient scores indicate a low clinical risk of decompensation within 24 hours for Covid-19. Clinically, the patient's symptoms are consistent with viral illness. I will prescribe symptomatic medications. I estimate there is LOW risk for EPIGLOTTITIS, PNEUMONIA, MENINGITIS, OR URINARY TRACT INFECTION, thus I consider the discharge disposition reasonable. Also, there is no evidence or peritonitis, sepsis, or toxicity. Smith Carlin and I have discussed the diagnosis and risks, and we agree with discharging home to follow-up with their primary doctor. We also discussed returning to the Emergency Department immediately if new or worsening symptoms occur. We have discussed the symptoms which are most concerning (e.g., changing or worsening pain, trouble swallowing or breating, neck stiffness, fever) that necessitate immediate return. The patient was instructed to follow up as an outpatient in 2 days. The patient was instructed to return to the ED immediately for any new or worsening symptoms. The patient verbalized understanding. Discharge Vital Signs:  Blood pressure 116/78, pulse 74, temperature 97.7 °F (36.5 °C), temperature source Oral, resp. rate 16, weight 135 lb 9.3 oz (61.5 kg), last menstrual period 12/02/2020, SpO2 99 %. FINAL IMPRESSION    1. COVID-19 virus test result unknown    2.  Nasal congestion        PLAN  Outpatient medications, followup, and discharge instructions (see EMR)      (Please note that this note was completed with a voice recognition program.  Every attempt was made to edit the dictations, but inevitably there remain words that are mis-transcribed.)              Shannon Christian, VELASQUEZ - DYLAN  12/06/20 0461

## 2020-12-06 NOTE — ED NOTES
Discharge and education instructions reviewed. Patient verbalized understanding, teach-back successful. Patient denied questions at this time. No acute distress noted. Patient instructed to follow-up as noted - return to emergency department if symptoms worsen. Patient verbalized understanding. Discharged per EDMD with discharge instructions.           Roderick Sloan RN  12/06/20 7867

## 2020-12-07 ENCOUNTER — CARE COORDINATION (OUTPATIENT)
Dept: CARE COORDINATION | Age: 25
End: 2020-12-07

## 2020-12-07 LAB — SARS-COV-2, PCR: DETECTED

## 2020-12-07 NOTE — CARE COORDINATION
Patient contacted regarding [id number] diagnosis\". Discussed COVID-19 related testing which was available at this time. Test results were positive. Patient informed of results, if available? Yes    Care Transition Nurse/ Ambulatory Care Manager contacted the patient by telephone to perform post discharge assessment. Call within 2 business days of discharge: Yes. Verified name and  with patient as identifiers. Provided introduction to self, and explanation of the CTN/ACM role, and reason for call due to risk factors for infection and/or exposure to COVID-19. Patient stated she has NO SYMPTOMS and that this ACM ws the third person to call her re results today. She asked that she have Scott Perez for oversight, and Inquired as to whether she needed help w scheduling a follow up appt with her provider. Pt declined and hung up  no further outreach planned based on severity of symptoms and risk factors.

## 2020-12-07 NOTE — RESULT ENCOUNTER NOTE
Patient's positive result has been appropriately evaluated by the provider pool. Patient was contacted and notified of the result. The patient was called for notification of a POSITIVE test result for COVID-19. The following information was given to the patient:    The COVID-19 test result was positive  Treatment of coronavirus does not require an antibiotic  Remain isolated for 10 days since symptoms first appeared AND At least 3 days have passed after recovery (Recovery is defined as resolution of fever without the use of fever-reducing medications with progressive improvement or resolution of other symptoms). Wash hands often with soap and water for at least 20 seconds or alternatively use hand  with at least 60% alcohol content  Cover coughs and sneezes  Wear a mask when around others if possible  Clean all high-touch surfaces every day, such as doorknobs and cellphones  Continually monitor symptoms. Contact a medical provider if symptoms are worsening, such as difficulty breathing. For additional information, please visit the Centers for Disease Control and Prevention ([email or web address].

## 2021-06-03 ENCOUNTER — HOSPITAL ENCOUNTER (EMERGENCY)
Age: 26
Discharge: HOME | End: 2021-06-03
Attending: EMERGENCY MEDICINE
Payer: MEDICARE

## 2021-06-03 VITALS
OXYGEN SATURATION: 100 % | TEMPERATURE: 98.5 F | SYSTOLIC BLOOD PRESSURE: 113 MMHG | RESPIRATION RATE: 22 BRPM | HEART RATE: 75 BPM | DIASTOLIC BLOOD PRESSURE: 77 MMHG | WEIGHT: 138.45 LBS | BODY MASS INDEX: 24.53 KG/M2

## 2021-06-03 DIAGNOSIS — R56.9 SEIZURE (HCC): Primary | ICD-10-CM

## 2021-06-03 DIAGNOSIS — Z87.898 HISTORY OF SEIZURES: ICD-10-CM

## 2021-06-03 DIAGNOSIS — Z91.14 NON COMPLIANCE W MEDICATION REGIMEN: ICD-10-CM

## 2021-06-03 LAB
ANION GAP SERPL CALCULATED.3IONS-SCNC: 23 MMOL/L (ref 3–16)
ANISOCYTOSIS: ABNORMAL
BASOPHILS ABSOLUTE: 0.1 K/UL (ref 0–0.2)
BASOPHILS RELATIVE PERCENT: 1 %
BUN BLDV-MCNC: 8 MG/DL (ref 7–20)
CALCIUM SERPL-MCNC: 8.9 MG/DL (ref 8.3–10.6)
CHLORIDE BLD-SCNC: 100 MMOL/L (ref 99–110)
CO2: 12 MMOL/L (ref 21–32)
CREAT SERPL-MCNC: 0.8 MG/DL (ref 0.6–1.1)
EKG ATRIAL RATE: 91 BPM
EKG DIAGNOSIS: NORMAL
EKG P AXIS: 59 DEGREES
EKG P-R INTERVAL: 168 MS
EKG Q-T INTERVAL: 368 MS
EKG QRS DURATION: 88 MS
EKG QTC CALCULATION (BAZETT): 452 MS
EKG R AXIS: 60 DEGREES
EKG T AXIS: 40 DEGREES
EKG VENTRICULAR RATE: 91 BPM
EOSINOPHILS ABSOLUTE: 0.1 K/UL (ref 0–0.6)
EOSINOPHILS RELATIVE PERCENT: 1.9 %
GFR AFRICAN AMERICAN: >60
GFR NON-AFRICAN AMERICAN: >60
GLUCOSE BLD-MCNC: 130 MG/DL (ref 70–99)
HCG QUALITATIVE: NEGATIVE
HCT VFR BLD CALC: 40.4 % (ref 36–48)
HEMOGLOBIN: 12.4 G/DL (ref 12–16)
LYMPHOCYTES ABSOLUTE: 2.9 K/UL (ref 1–5.1)
LYMPHOCYTES RELATIVE PERCENT: 46.4 %
MCH RBC QN AUTO: 24.1 PG (ref 26–34)
MCHC RBC AUTO-ENTMCNC: 30.6 G/DL (ref 31–36)
MCV RBC AUTO: 78.8 FL (ref 80–100)
MICROCYTES: ABNORMAL
MONOCYTES ABSOLUTE: 0.6 K/UL (ref 0–1.3)
MONOCYTES RELATIVE PERCENT: 9.3 %
NEUTROPHILS ABSOLUTE: 2.6 K/UL (ref 1.7–7.7)
NEUTROPHILS RELATIVE PERCENT: 41.4 %
PDW BLD-RTO: 18.6 % (ref 12.4–15.4)
PLATELET # BLD: 192 K/UL (ref 135–450)
PMV BLD AUTO: 10 FL (ref 5–10.5)
POIKILOCYTES: ABNORMAL
POTASSIUM REFLEX MAGNESIUM: 4.4 MMOL/L (ref 3.5–5.1)
RBC # BLD: 5.13 M/UL (ref 4–5.2)
SLIDE REVIEW: 1
SODIUM BLD-SCNC: 135 MMOL/L (ref 136–145)
WBC # BLD: 6.2 K/UL (ref 4–11)

## 2021-06-03 PROCEDURE — 93005 ELECTROCARDIOGRAM TRACING: CPT | Performed by: EMERGENCY MEDICINE

## 2021-06-03 PROCEDURE — 36415 COLL VENOUS BLD VENIPUNCTURE: CPT

## 2021-06-03 PROCEDURE — 85025 COMPLETE CBC W/AUTO DIFF WBC: CPT

## 2021-06-03 PROCEDURE — 80175 DRUG SCREEN QUAN LAMOTRIGINE: CPT

## 2021-06-03 PROCEDURE — 80048 BASIC METABOLIC PNL TOTAL CA: CPT

## 2021-06-03 PROCEDURE — 6370000000 HC RX 637 (ALT 250 FOR IP): Performed by: EMERGENCY MEDICINE

## 2021-06-03 PROCEDURE — 99285 EMERGENCY DEPT VISIT HI MDM: CPT

## 2021-06-03 PROCEDURE — 84703 CHORIONIC GONADOTROPIN ASSAY: CPT

## 2021-06-03 PROCEDURE — 93010 ELECTROCARDIOGRAM REPORT: CPT | Performed by: INTERNAL MEDICINE

## 2021-06-03 RX ORDER — ACETAMINOPHEN 325 MG/1
650 TABLET ORAL ONCE
Status: COMPLETED | OUTPATIENT
Start: 2021-06-03 | End: 2021-06-03

## 2021-06-03 RX ORDER — LAMOTRIGINE 100 MG/1
TABLET ORAL
Qty: 322 TABLET | Refills: 1 | Status: SHIPPED | OUTPATIENT
Start: 2021-06-03 | End: 2022-10-07

## 2021-06-03 RX ORDER — IBUPROFEN 400 MG/1
400 TABLET ORAL ONCE
Status: COMPLETED | OUTPATIENT
Start: 2021-06-03 | End: 2021-06-03

## 2021-06-03 RX ADMIN — IBUPROFEN 400 MG: 400 TABLET ORAL at 12:06

## 2021-06-03 RX ADMIN — LAMOTRIGINE 150 MG: 100 TABLET ORAL at 11:28

## 2021-06-03 RX ADMIN — ACETAMINOPHEN 650 MG: 325 TABLET ORAL at 12:06

## 2021-06-03 ASSESSMENT — PAIN DESCRIPTION - PAIN TYPE
TYPE: ACUTE PAIN
TYPE: ACUTE PAIN

## 2021-06-03 ASSESSMENT — PAIN DESCRIPTION - LOCATION
LOCATION: HEAD
LOCATION: HEAD

## 2021-06-03 ASSESSMENT — PAIN SCALES - GENERAL
PAINLEVEL_OUTOF10: 8

## 2021-06-03 ASSESSMENT — PAIN DESCRIPTION - DESCRIPTORS
DESCRIPTORS: ACHING
DESCRIPTORS: ACHING

## 2021-06-03 NOTE — ED NOTES
Pt ambulated to bathroom with no c/o increased dizziness and with a steady gait.       Kym Randolph RN  06/03/21 7162

## 2021-06-03 NOTE — ED TRIAGE NOTES
Pt arrived to dept via EMS from home. Pt c/o a witnessed seizure in her sleep lasting approx 1 minute. Pt postictal upon arrival but reports taking lamotrigine twice a day. Pt states that she took her medicine last night but did not take it this morning. Pt awake, alert and oriented x 3. Skin warm and dry/normal color for ethnicity. Resp easy and unlabored. Pt placed in gown and on cardiac monitor. Call light in reach. Will continue to monitor.

## 2021-06-03 NOTE — ED NOTES
Discharge and education instructions reviewed. Patient verbalized understanding, teach-back successful. Patient denied questions at this time. No acute distress noted. Patient instructed to follow-up as noted - return to emergency department if symptoms worsen. Patient verbalized understanding. Discharged per EDMD with discharge instructions.           Jennifer Stover RN  06/03/21 8296

## 2021-06-03 NOTE — ED NOTES
Bed: B-34  Expected date: 6/3/21  Expected time: 9:12 AM  Means of arrival: [facility]  Comments:  26F seizure     Ramya Ohara [facility]  06/03/21 8901

## 2021-06-03 NOTE — ED PROVIDER NOTES
[address]      Pt Name: Benji Miller  MRN: [id number]  [place] 1995  Date of evaluation: 6/3/2021  Provider: Viola Gomes MD    CHIEF COMPLAINT     Per family in the room \"she had a seizure\"  HISTORY OF PRESENT ILLNESS  (Location/Symptom, Timing/Onset,Context/Setting, Quality, Duration, Modifying Factors, Severity). Note limiting factors. Benji Miller is a 27 y.o. female who presents to the emergency department secondary to concern for seizure she had this morning. Girlfriend at the bedside states the seizure lasted approximately 3 minutes. She states it was her normal seizure, she will let beforehand, head whole body shaking. She denies anything new about the seizure. She denies any new medications. Denies any known alcohol use. No known sick contacts, patient has been healthy recently, no known trauma. Patient on arrival is postictal, able to provide little information though is confused. Past medical history noted below, significant for asthma and seizures since 2013. Review of medical record shows she is on lamotrigine. Not a smoker. Aside from what is stated above denies any other symptoms or modifying factors. Nursing Notes reviewed. REVIEW OF SYSTEMS  (2-9 systems for level 4, 10 or more for level 5)   Review of Systems limited due to patient postictal.  PAST MEDICAL HISTORY     Past Medical History:   Diagnosis Date    Asthma     Seizures ([facility] [address]     pt states since 2013       SURGICALHISTORY     History reviewed. No pertinent surgical history. CURRENT MEDICATIONS       Previous Medications    ERYTHROMYCIN (ROMYCIN) 5 MG/GM OPHTHALMIC OINTMENT    Apply to the lower eyelid margin 4 times daily for 5 days.     FLUTICASONE (FLONASE) 50 MCG/ACT NASAL SPRAY    1 spray by Each Nostril route daily    FOLIC ACID (FOLVITE) 1 MG TABLET    Take 1 mg by mouth daily    LORAZEPAM (ATIVAN) 1 MG TABLET    Take 1 tablet by mouth 3 times daily as needed for Anxiety      ALLERGIES     Patient has no known allergies. FAMILY HISTORY     History reviewed. No pertinent family history. SOCIAL HISTORY       Social History     Socioeconomic History    Marital status: Single     Spouse name: None    Number of children: None    Years of education: None    Highest education level: None   Occupational History    None   Tobacco Use    Smoking status: Former Smoker     Types: Cigars    Smokeless tobacco: Never Used   Vaping Use    Vaping Use: Never used   Substance and Sexual Activity    Alcohol use: No    Drug use: Yes     Types: Marijuana     Comment: daily    Sexual activity: None   Other Topics Concern    None   Social History Narrative    None     Social Determinants of Health     Financial Resource Strain:     Difficulty of Paying Living Expenses:    Food Insecurity:     Worried About Running Out of Food in the Last Year:     Ran Out of Food in the Last Year:    Transportation Needs:     Lack of Transportation (Medical):  Lack of Transportation (Non-Medical):    Physical Activity:     Days of Exercise per Week:     Minutes of Exercise per Session:    Stress:     Feeling of Stress :    Social Connections:     Frequency of Communication with Friends and Family:     Frequency of Social Gatherings with Friends and Family:     Attends [employer] Services:     Active Member of Clubs or Organizations:     Attends Club or Organization Meetings:     Marital Status:    Intimate Partner Violence:     Fear of Current or Ex-Partner:     Emotionally Abused:     Physically Abused:     Sexually Abused:      SCREENINGS         PHYSICAL EXAM  (up to 7 for level 4, 8 or more for level 5)   INITIAL VITALS: BP: 134/72, Temp: 98.5 °F (36.9 °C), Pulse: 100, Resp: 23, SpO2: 99 %   Physical Exam  Constitutional:       Appearance: She is not ill-appearing or toxic-appearing. HENT:      Head: Normocephalic and atraumatic. Right Ear: External ear normal.      Left Ear: External ear normal.      Nose: Nose normal.      Mouth/Throat:      Comments: Left side tongue biting, no active bleeding  Eyes:      General: No scleral icterus. Right eye: No discharge. Left eye: No discharge. Extraocular Movements: Extraocular movements intact. Conjunctiva/sclera: Conjunctivae normal.      Pupils: Pupils are equal, round, and reactive to light. Neck:      Trachea: No tracheal deviation. Cardiovascular:      Rate and Rhythm: Normal rate. Pulses: Normal pulses. Comments: Rate 90s on the monitor  Pulmonary:      Effort: Pulmonary effort is normal. No respiratory distress. Abdominal:      General: There is no distension. Tenderness: There is no abdominal tenderness. There is no guarding or rebound. Musculoskeletal:         General: Normal range of motion. Cervical back: Normal range of motion. No rigidity. Right lower leg: No edema. Left lower leg: No edema. Skin:     General: Skin is warm and dry. Capillary Refill: Capillary refill takes less than 2 seconds. Neurological:      General: No focal deficit present. Mental Status: She is confused. Cranial Nerves: Cranial nerves are intact. Sensory: Sensation is intact. Comments: Knows her name, date of birth, that she is at a hospital.  Difficulty with month and year initially. Moves all extremities spontaneously. Can follow simple commands. Gait deferred secondary to safety       DIAGNOSTIC RESULTS   EKG: interpreted by the Emergency Department Physician who either signs or Co-signs this chart in the absence of a cardiologist.  Indication: Seizure  Interpretation: Rate 91, rhythm sinus, axis normal.  WI/QRS/QTc within normal limits. No T/ST changes consistent with acute ischemia. No prior EKG available for comparison.     RADIOLOGY:   Interpretation per Radiologist below, if available at the time of this note:  No orders to display     LABS:  Labs Reviewed   CBC WITH AUTO DIFFERENTIAL - Abnormal; Notable for the following components:       Result Value    MCV 78.8 (*)     MCH 24.1 (*)     MCHC 30.6 (*)     RDW 18.6 (*)     Anisocytosis Occasional (*)     Microcytes Occasional (*)     Poikilocytes Occasional (*)     All other components within normal limits    Narrative:     Performed at:  [address] [address]   Phone [phone]   BASIC METABOLIC PANEL W/ REFLEX TO MG FOR LOW K - Abnormal; Notable for the following components:    Sodium 135 (*)     CO2 12 (*)     Anion Gap 23 (*)     Glucose 130 (*)     All other components within normal limits    Narrative:     Audi Aleks tel. [phone],  Chemistry results called to and read back by Natividad Baron RN, 06/03/2021  10:11, by Merline Fusi  Performed at:  [address] [address]   Phone [phone]   HCG, SERUM, QUALITATIVE    Narrative:     Performed at:  [address] [address]   Phone [phone]   LAMOTRIGINE LEVEL   POCT GLUCOSE       EMERGENCY DEPARTMENT COURSE and DIFFERENTIAL DIAGNOSIS/MDM:   Patient was given the following medications:  Orders Placed This Encounter   Medications    lamoTRIgine (LAMICTAL) tablet 150 mg    lamoTRIgine (LAMICTAL) 100 MG tablet     Sig: Take 1.5 tablets by mouth every morning AND 2 tablets nightly. Dispense:  322 tablet     Refill:  1    ibuprofen (ADVIL;MOTRIN) tablet 400 mg    acetaminophen (TYLENOL) tablet 650 mg     CONSULTS:  None    INITIAL VITALS: BP: 134/72, Temp: 98.5 °F (36.9 °C), Pulse: 100, Resp: 23, SpO2: 99 %   RECENT VITALS:  BP: 113/77,Temp: 98.5 °F (36.9 °C), Pulse: 75, Resp: 22, SpO2: 100 %     Osmin Carrier is a 27 y.o. female who presents to the emergency department secondary to concern for seizure. She has a history of seizures. On arrival she is postictal.  Her initial exam is noted above, no focal neurologic deficits are noted. Per family at the bedside no recent systemic symptoms concerning for infection, no trauma. A peripheral IV was placed, labs were ordered along with an EKG. After further discussion with patient when she was more awake she does admit to missing medication for her seizures due to running low. She states she has a primary care but she has not seen them in the \"a long time\". She states her lamotrigine medication (which she is able to tell me the correct dosage for) has no refills and she will run out in a few days. EKG unremarkable. Labs consistent with recent seizure. On reassessment she reports feeling significantly better. She does have a mild headache which she states happens after she has a seizure. She was ordered Tylenol. She is able to walk to the restroom with a steady gait. Family at bedside states she is at her mental baseline. We reiterated importance of following up with primary care and since she does not currently have when she sees actively (the one in the system she has not seen in many years) she was given a referral to a new one. Prior to discharge we discussed return precautions as well as seizure precautions including no driving, no showering when home alone or going up on ladders etc. which she stated she had her before and expressed understanding of. She does not currently drive regardless she says. FINAL IMPRESSION      1. Seizure ([address]    2. History of seizures    3.  Non compliance w medication regimen        DISPOSITION/PLAN   DISPOSITION Decision To Discharge 06/03/2021 10:16:56 AM      PATIENT REFERRED TO:  Christopher Dawson  [phone]  Schedule an appointment as soon as possible for a visit   For follow up appointment to set up primary care    [facility] - Neurology  [place]

## 2021-06-05 LAB — LAMOTRIGINE LEVEL: 1.7 UG/ML (ref 2.5–15)

## 2022-07-26 NOTE — ED TRIAGE NOTES
Pt c/o eye redness for 2 days. Pt has redness on lateral side of left eye and medial side of right eye. Pt denies any trauma. Pt is currently alert and oriented x 4. Pt is answering questions approprietly, in full sentences without difficulty or trouble breathing. Pt is currently resting in bed in supine position. Pt VS are as noted.
No

## 2022-10-07 ENCOUNTER — HOSPITAL ENCOUNTER (EMERGENCY)
Age: 27
Discharge: HOME | End: 2022-10-07
Attending: EMERGENCY MEDICINE
Payer: MEDICARE

## 2022-10-07 VITALS
DIASTOLIC BLOOD PRESSURE: 75 MMHG | SYSTOLIC BLOOD PRESSURE: 114 MMHG | TEMPERATURE: 98.4 F | RESPIRATION RATE: 18 BRPM | HEART RATE: 84 BPM | OXYGEN SATURATION: 100 %

## 2022-10-07 DIAGNOSIS — G40.919 BREAKTHROUGH SEIZURE (HCC): Primary | ICD-10-CM

## 2022-10-07 PROCEDURE — 6370000000 HC RX 637 (ALT 250 FOR IP)

## 2022-10-07 PROCEDURE — 99283 EMERGENCY DEPT VISIT LOW MDM: CPT

## 2022-10-07 RX ORDER — LAMOTRIGINE 100 MG/1
TABLET ORAL
Qty: 30 TABLET | Refills: 0 | Status: SHIPPED | OUTPATIENT
Start: 2022-10-07

## 2022-10-07 RX ORDER — LAMOTRIGINE 150 MG/1
150 TABLET ORAL ONCE
Status: COMPLETED | OUTPATIENT
Start: 2022-10-07 | End: 2022-10-07

## 2022-10-07 RX ORDER — LAMOTRIGINE 25 MG/1
50 TABLET ORAL ONCE
Status: DISCONTINUED | OUTPATIENT
Start: 2022-10-07 | End: 2022-10-07

## 2022-10-07 RX ADMIN — LAMOTRIGINE 150 MG: 150 TABLET ORAL at 19:57

## 2022-10-07 ASSESSMENT — ENCOUNTER SYMPTOMS
BACK PAIN: 0
DIARRHEA: 0
COUGH: 1
CONSTIPATION: 0
VOMITING: 0
SHORTNESS OF BREATH: 0
NAUSEA: 1

## 2022-10-07 ASSESSMENT — PAIN SCALES - GENERAL: PAINLEVEL_OUTOF10: 9

## 2022-10-07 ASSESSMENT — PAIN DESCRIPTION - LOCATION: LOCATION: HEAD

## 2022-10-07 ASSESSMENT — PAIN - FUNCTIONAL ASSESSMENT: PAIN_FUNCTIONAL_ASSESSMENT: 0-10

## 2022-10-07 ASSESSMENT — PAIN DESCRIPTION - DESCRIPTORS: DESCRIPTORS: ACHING

## 2022-10-07 NOTE — Clinical Note
Radhika Macedo was seen and treated in our emergency department on 10/7/2022. Return to Work Note    Radhika Macedo    To whom it may concern,    The above employee has been released by the above physician to return to work with the following restrictions, until cleared to do so by her neurologist:  No lifting of objects greater than 50 pounds. No driving or operating heavy equipment. No climbing ladders. If you have any questions do not hesitate to contact me.   Kingston Coles MD  [facility][note 3] [address]       Kingston Coles MD

## 2022-10-07 NOTE — ED PROVIDER NOTES
ED Attending Attestation Note     Date of evaluation: 10/7/2022    This patient was seen by the resident. I have seen and examined the patient, agree with the workup, evaluation, management and diagnosis. The care plan has been discussed. My assessment reveals a slender, generally well-appearing young woman, in no acute distress. She has a known history of a seizure disorder, previously followed by  neurology, and maintained for several years on Lamictal.  However, she has had difficulty with outpatient follow-up, and has been without her Lamictal for the last several months. She had a witnessed seizure episode today, perhaps an hour or 2 prior to arrival.  This occurred while she was sleeping this afternoon, as she states her seizures always do, and was witnessed by her mother. She is now returned to baseline mental status, eating and drinking, and has no complaints. She denies any recent illnesses, changes in p.o. intake, or any symptoms which would be concerning for a process that could decrease her seizure threshold. This is felt to be most likely a breakthrough seizure related to lack of her home medications. She was given her established evening dose of Lamictal in the emergency department today, and a prescription for her established home dose, as well as a new referral to neurology to help to arrange for outpatient follow-up.        Munir Hughes MD  10/07/22 [id number]

## 2022-10-07 NOTE — ED NOTES
Initial contact with patient. Report received from Patricia Esteves. Patient denies pain or needs at this time. Updated on plan of care.       Eric Mullins RN  10/07/22 1921

## 2022-10-07 NOTE — ED PROVIDER NOTES
[address] [facility] RESIDENT NOTE       Date of evaluation: 10/7/2022    Chief Complaint     Seizures (Seizure about 1 hour ago unsure how long it lasted. Bit tongue and was incontinent )      History of Present Illness     Petr Beth is a 32 y.o. female with a PMH of asthma, epilepsy who presents after a seizure earlier this evening. Reports her mother came to wake her up at 4:45pm and found her with legs shaking, sweating and foaming at mouth. Patient also reports having urinated and bitten her tongue. This description is in line with her usual seizure. She now endorses nausea which is similar to her usual postictal state. She denies feeling confused, but endorses drowsiness/tiredness. She denies any vomiting. She denies fever, chills, shortness of breath, diarrhea. She endorses generalized weakness that has been improving since her seizure. She denies any changes over the past week, denies any recent illness besides a cold about 2 months ago. Patient states that she usually wakes up for the day around 11 AM, but today she went back to sleep around 6 (as per usual for her) and did not wake up until her mother awoke her at 4:45 PM.  Patient states she has not had her lamotrigine since June (when she was \"fired\"from a provider at [facility]). This year she has had 3 seizures prior to this one. Review of Systems     Review of Systems   Constitutional:  Positive for fatigue. Negative for chills and fever. HENT:  Negative for nosebleeds. Eyes:  Negative for visual disturbance. Respiratory:  Positive for cough. Negative for shortness of breath. Cardiovascular:  Negative for chest pain and palpitations. Gastrointestinal:  Positive for nausea. Negative for constipation, diarrhea and vomiting. Endocrine: Negative for polyuria. Genitourinary:  Negative for flank pain and urgency. Musculoskeletal:  Negative for back pain and neck pain.    Skin: Negative for wound. Neurological:  Positive for weakness. Negative for numbness and headaches. Psychiatric/Behavioral:  Negative for confusion. Past Medical, Surgical, Family, and Social History     She has a past medical history of Asthma and Seizures ([address] She has no past surgical history on file. Her family history is not on file. She reports that she has quit smoking. Her smoking use included cigars. She has never used smokeless tobacco. She reports current drug use. Drug: Marijuana S[employer]). She reports that she does not drink alcohol. Medications     Previous Medications    FLUTICASONE (FLONASE) 50 MCG/ACT NASAL SPRAY    1 spray by Each Nostril route daily    FOLIC ACID (FOLVITE) 1 MG TABLET    Take 1 mg by mouth daily       Allergies     She has No Known Allergies. Physical Exam     INITIAL VITALS: BP: 111/80, Temp: 98.4 °F (36.9 °C), Heart Rate: 84, Resp: 18, SpO2: 100 %   Physical Exam  Constitutional:       Appearance: Normal appearance. She is normal weight. HENT:      Head: Normocephalic and atraumatic. Right Ear: External ear normal.      Left Ear: External ear normal.      Nose: Nose normal.      Mouth/Throat:      Mouth: Mucous membranes are moist.      Pharynx: Oropharynx is clear. Eyes:      Conjunctiva/sclera: Conjunctivae normal.      Pupils: Pupils are equal, round, and reactive to light. Cardiovascular:      Rate and Rhythm: Normal rate and regular rhythm. Pulses: Normal pulses. Heart sounds: Normal heart sounds. Pulmonary:      Effort: Pulmonary effort is normal.      Breath sounds: Normal breath sounds. Abdominal:      General: Abdomen is flat. Palpations: Abdomen is soft. Musculoskeletal:      Cervical back: Normal range of motion and neck supple. Skin:     General: Skin is warm and dry. Neurological:      General: No focal deficit present. Mental Status: She is alert and oriented to person, place, and time.       Motor: Weakness present. Diagnostic Results     RADIOLOGY:  No orders to display       LABS:   No results found for this visit on 10/07/22. ED BEDSIDE ULTRASOUND:  No results found. RECENT VITALS:  BP: 120/80, Temp: 98.4 °F (36.9 °C),Heart Rate: 84, Resp: 18, SpO2: 100 %       ED Course     Nursing Notes, Past Medical Hx, Past Surgical Hx, Social Hx, Allergies, and FamilyHx were reviewed. The patient was giventhe following medications:  Orders Placed This Encounter   Medications    DISCONTD: lamoTRIgine (LAMICTAL) tablet 50 mg    lamoTRIgine (LAMICTAL) tablet 150 mg    lamoTRIgine (LAMICTAL) 100 MG tablet     Sig: Take 1.5 tablets by mouth every morning AND 2 tablets at bedtime. Dispense:  30 tablet     Refill:  0       CONSULTS:  None    MEDICAL DECISION MAKING / ASSESSMENT / Rah Kong is a 32 y.o. female with a PMH of asthma, epilepsy who presents after a seizure earlier this evening. Reports her mother came to wake her up at 4:45pm and found her with legs shaking, sweating and foaming at mouth. Patient also reports having urinated and bitten her tongue. This description is in line with her usual seizure. She now endorses nausea which is similar to her usual postictal state. She denies feeling confused, but endorses drowsiness/tiredness. She denies any vomiting. She denies fever, chills, shortness of breath, diarrhea. She endorses generalized weakness that has been improving since her seizure. She denies any changes over the past week, denies any recent illness besides a cold about 2 months ago. Patient states that she usually wakes up for the day around 11 AM, but today she went back to sleep around 6 (as per usual for her) and did not wake up until her mother awoke her at 4:45 PM.  Patient states she has not had her lamotrigine since June (when she was fired from a provider at [facility] for missed appts). This year she has had 3 seizures prior to this one.   Patient with known seizure disorder off any antiepileptic drug presenting with seizure similar to her usual.  No reported red flag symptoms, no focal weakness, no recent illness. Patient given 1 dose of her lamotrigine, and given prescription refill for her lamotrigine, as well as referrals to neurologists to establish care with. This patient was also evaluated by the attending physician. All care plans were discussed and agreed upon. Clinical Impression     1. Breakthrough seizure [facility])        Disposition     PATIENT REFERRED TO:  [facility] [address] [facility],6Th [address] [address]  [phone]  Schedule an appointment as soon as possible for a visit in 4 week  Hospital Follow-up    [facility]  [address] [address] [address] [facility]  In 1 week  Hospital Follow-up    DISCHARGE MEDICATIONS:  New Prescriptions    LAMOTRIGINE (LAMICTAL) 100 MG TABLET    Take 1.5 tablets by mouth every morning AND 2 tablets at bedtime.        DISPOSITION Decision To Discharge 10/07/2022 07:57:21 PM       Elizabeth Ash MD  Resident  10/07/22 5283

## 2022-10-08 NOTE — ED NOTES
Discharge instructions given per provider order. Prescription(s) reviewed. Patient verbalized understanding.         Cayla Benavides RN  10/07/22 2513

## 2022-10-08 NOTE — DISCHARGE INSTRUCTIONS
Please follow-up with one of the neurologists above to establish care and to continue management of your epilepsy. Please return should you have a seizure that is different from your typical, or traumatic injury resulting from a seizure, or other concerning symptoms.

## 2023-03-25 ENCOUNTER — APPOINTMENT (OUTPATIENT)
Dept: CT IMAGING | Age: 28
End: 2023-03-25
Payer: MEDICAID

## 2023-03-25 ENCOUNTER — HOSPITAL ENCOUNTER (EMERGENCY)
Age: 28
Discharge: HOME | End: 2023-03-25
Payer: MEDICAID

## 2023-03-25 VITALS
OXYGEN SATURATION: 100 % | HEART RATE: 78 BPM | HEIGHT: 60 IN | TEMPERATURE: 98 F | DIASTOLIC BLOOD PRESSURE: 79 MMHG | RESPIRATION RATE: 17 BRPM | WEIGHT: 134.92 LBS | BODY MASS INDEX: 26.49 KG/M2 | SYSTOLIC BLOOD PRESSURE: 135 MMHG

## 2023-03-25 DIAGNOSIS — R56.9 SEIZURE (HCC): Primary | ICD-10-CM

## 2023-03-25 LAB
ALBUMIN SERPL-MCNC: 4.2 G/DL (ref 3.4–5)
ALBUMIN/GLOB SERPL: 1.3 {RATIO} (ref 1.1–2.2)
ALP SERPL-CCNC: 46 U/L (ref 40–129)
ALT SERPL-CCNC: 18 U/L (ref 10–40)
AMPHETAMINES UR QL SCN>1000 NG/ML: ABNORMAL
ANION GAP SERPL CALCULATED.3IONS-SCNC: 11 MMOL/L (ref 3–16)
AST SERPL-CCNC: 27 U/L (ref 15–37)
BACTERIA URNS QL MICRO: ABNORMAL /HPF
BARBITURATES UR QL SCN>200 NG/ML: ABNORMAL
BASOPHILS # BLD: 0 K/UL (ref 0–0.2)
BASOPHILS NFR BLD: 0.5 %
BENZODIAZ UR QL SCN>200 NG/ML: ABNORMAL
BILIRUB SERPL-MCNC: 0.4 MG/DL (ref 0–1)
BILIRUB UR QL STRIP.AUTO: NEGATIVE
BUN SERPL-MCNC: 7 MG/DL (ref 7–20)
CALCIUM SERPL-MCNC: 8.7 MG/DL (ref 8.3–10.6)
CANNABINOIDS UR QL SCN>50 NG/ML: POSITIVE
CHLORIDE SERPL-SCNC: 103 MMOL/L (ref 99–110)
CLARITY UR: ABNORMAL
CO2 SERPL-SCNC: 19 MMOL/L (ref 21–32)
COCAINE UR QL SCN: ABNORMAL
COLOR UR: YELLOW
CREAT SERPL-MCNC: 0.5 MG/DL (ref 0.6–1.1)
DEPRECATED RDW RBC AUTO: 17.8 % (ref 12.4–15.4)
DRUG SCREEN COMMENT UR-IMP: ABNORMAL
EOSINOPHIL # BLD: 0 K/UL (ref 0–0.6)
EOSINOPHIL NFR BLD: 0.3 %
EPI CELLS #/AREA URNS AUTO: 2 /HPF (ref 0–5)
FENTANYL SCREEN, URINE: ABNORMAL
GFR SERPLBLD CREATININE-BSD FMLA CKD-EPI: >60 ML/MIN/{1.73_M2}
GLUCOSE SERPL-MCNC: 83 MG/DL (ref 70–99)
GLUCOSE UR STRIP.AUTO-MCNC: NEGATIVE MG/DL
HCT VFR BLD AUTO: 34 % (ref 36–48)
HGB BLD-MCNC: 10.8 G/DL (ref 12–16)
HGB UR QL STRIP.AUTO: NEGATIVE
HYALINE CASTS #/AREA URNS AUTO: 2 /LPF (ref 0–8)
KETONES UR STRIP.AUTO-MCNC: ABNORMAL MG/DL
LACTATE BLDV-SCNC: 0.9 MMOL/L (ref 0.4–2)
LEUKOCYTE ESTERASE UR QL STRIP.AUTO: NEGATIVE
LYMPHOCYTES # BLD: 1.1 K/UL (ref 1–5.1)
LYMPHOCYTES NFR BLD: 14 %
MCH RBC QN AUTO: 23.1 PG (ref 26–34)
MCHC RBC AUTO-ENTMCNC: 31.7 G/DL (ref 31–36)
MCV RBC AUTO: 72.6 FL (ref 80–100)
METHADONE UR QL SCN>300 NG/ML: ABNORMAL
MONOCYTES # BLD: 0.6 K/UL (ref 0–1.3)
MONOCYTES NFR BLD: 7.6 %
NEUTROPHILS # BLD: 6.2 K/UL (ref 1.7–7.7)
NEUTROPHILS NFR BLD: 77.6 %
NITRITE UR QL STRIP.AUTO: NEGATIVE
OPIATES UR QL SCN>300 NG/ML: ABNORMAL
OXYCODONE UR QL SCN: ABNORMAL
PCP UR QL SCN>25 NG/ML: ABNORMAL
PH UR STRIP.AUTO: 5.5 [PH] (ref 5–8)
PH UR STRIP: 5.5 [PH]
PLATELET # BLD AUTO: 191 K/UL (ref 135–450)
PLATELET BLD QL SMEAR: ADEQUATE
PMV BLD AUTO: 11.1 FL (ref 5–10.5)
POTASSIUM SERPL-SCNC: 4 MMOL/L (ref 3.5–5.1)
PROT SERPL-MCNC: 7.5 G/DL (ref 6.4–8.2)
PROT UR STRIP.AUTO-MCNC: NEGATIVE MG/DL
RBC # BLD AUTO: 4.67 M/UL (ref 4–5.2)
RBC CLUMPS #/AREA URNS AUTO: 4 /HPF (ref 0–4)
SLIDE REVIEW: ABNORMAL
SODIUM SERPL-SCNC: 133 MMOL/L (ref 136–145)
SP GR UR STRIP.AUTO: 1.02 (ref 1–1.03)
UA COMPLETE W REFLEX CULTURE PNL UR: ABNORMAL
UA DIPSTICK W REFLEX MICRO PNL UR: YES
URN SPEC COLLECT METH UR: ABNORMAL
UROBILINOGEN UR STRIP-ACNC: 0.2 E.U./DL
WBC # BLD AUTO: 8 K/UL (ref 4–11)
WBC #/AREA URNS AUTO: 4 /HPF (ref 0–5)

## 2023-03-25 PROCEDURE — 80307 DRUG TEST PRSMV CHEM ANLYZR: CPT

## 2023-03-25 PROCEDURE — 83605 ASSAY OF LACTIC ACID: CPT

## 2023-03-25 PROCEDURE — 80053 COMPREHEN METABOLIC PANEL: CPT

## 2023-03-25 PROCEDURE — 70450 CT HEAD/BRAIN W/O DYE: CPT

## 2023-03-25 PROCEDURE — 80175 DRUG SCREEN QUAN LAMOTRIGINE: CPT

## 2023-03-25 PROCEDURE — 85025 COMPLETE CBC W/AUTO DIFF WBC: CPT

## 2023-03-25 PROCEDURE — 99284 EMERGENCY DEPT VISIT MOD MDM: CPT

## 2023-03-25 PROCEDURE — 81001 URINALYSIS AUTO W/SCOPE: CPT

## 2023-03-25 PROCEDURE — 36415 COLL VENOUS BLD VENIPUNCTURE: CPT

## 2023-03-25 ASSESSMENT — PAIN DESCRIPTION - LOCATION: LOCATION: GENERALIZED

## 2023-03-25 ASSESSMENT — PAIN DESCRIPTION - DESCRIPTORS: DESCRIPTORS: ACHING

## 2023-03-25 ASSESSMENT — PAIN - FUNCTIONAL ASSESSMENT
PAIN_FUNCTIONAL_ASSESSMENT: 0-10
PAIN_FUNCTIONAL_ASSESSMENT: NONE - DENIES PAIN

## 2023-03-25 ASSESSMENT — ENCOUNTER SYMPTOMS
ABDOMINAL PAIN: 0
EYE PAIN: 0
VOMITING: 0
BACK PAIN: 0
SORE THROAT: 0
SHORTNESS OF BREATH: 0
NAUSEA: 0
COUGH: 0

## 2023-03-25 ASSESSMENT — PAIN SCALES - GENERAL: PAINLEVEL_OUTOF10: 3

## 2023-03-25 NOTE — LETTER
[facility] Emergency Department  [address] [facility] [address]  Phone: [phone]               March 25, 2023    Patient: Ana Collins   YOB: 1995   Date of Visit: 3/25/2023       To Whom It May Concern:    Ana Collins was seen and treated in our emergency department on 3/25/2023. She may return to work on 03/28/2023.       Sincerely,       Jereld Litten, RN         Signature:__________________________________

## 2023-03-25 NOTE — ED PROVIDER NOTES
tablets by mouth every morning AND 2 tablets at bedtime. Review of Systems:  (1 systems needed)  Review of Systems   Constitutional:  Negative for chills and fever. HENT:  Negative for congestion and sore throat. Eyes:  Negative for pain and visual disturbance. Respiratory:  Negative for cough and shortness of breath. Cardiovascular:  Negative for chest pain and leg swelling. Gastrointestinal:  Negative for abdominal pain, nausea and vomiting. Genitourinary:  Negative for dysuria and frequency. Musculoskeletal:  Negative for back pain and neck pain. Skin:  Negative for rash and wound. Neurological:  Positive for seizures. Negative for dizziness and light-headedness. \"Positives and Pertinent negatives as per HPI\"    Physical Exam:  Physical Exam  Vitals and nursing note reviewed. Constitutional:       Appearance: She is well-developed. She is not diaphoretic. HENT:      Head: Normocephalic and atraumatic. Nose: Nose normal.      Mouth/Throat:      Mouth: Mucous membranes are moist.      Pharynx: Oropharynx is clear. No oropharyngeal exudate or posterior oropharyngeal erythema. Eyes:      General:         Right eye: No discharge. Left eye: No discharge. Cardiovascular:      Rate and Rhythm: Normal rate and regular rhythm. Heart sounds: Normal heart sounds. No murmur heard. No friction rub. No gallop. Pulmonary:      Effort: Pulmonary effort is normal. No respiratory distress. Breath sounds: Normal breath sounds. No wheezing or rales. Chest:      Chest wall: No tenderness. Abdominal:      General: Bowel sounds are normal. There is no distension. Palpations: Abdomen is soft. There is no mass. Tenderness: There is no abdominal tenderness. There is no guarding or rebound. Hernia: No hernia is present. Musculoskeletal:         General: Normal range of motion. Cervical back: Normal range of motion and neck supple.    Skin: the Radiologist below, if available at the time of this note:    CT HEAD WO CONTRAST   Final Result   No acute intracranial abnormality. No results found. No results found. MEDICAL DECISION MAKING / ED COURSE:      PROCEDURES:   Procedures    Patient was given:  Medications - No data to display    CONSULTS: (Who and What was discussed)  None      Chronic Conditions affecting care:    has a past medical history of Asthma and Seizures ([facility] [address] Records Reviewed (External and Source)   I personally reviewed patient medical record    CC/HPI Summary, DDx, ED Course, and Reassessment:   Urgency room course: See HPI and physical exam above    Patient on exam pupils are equal round and reactive to light extraocular movement is intact. No nystagmus noted. Throat is clear. Neck is supple full range of motion without nuchal rigidity. No midline tender cervical, thoracic or lumbar spine. She has full range of motion all extremity. No facial drooping no slurred speech noted. She is not post ictal at this point she is at her normal self. She has no laceration to the tongue or the buccal mucosas. She is ambulatory. She is alert oriented x4. Does not appear to be in acute distress. Neurologically no motor or sensory deficit noted. Alert. Lab result from today shows:  CBC within normal limits with a white count 8.0. CMP unremarkable. Lactic acid 0.9    Urinalysis shows trace ketones. Otherwise unremarkable. Microscopically bacteria is 1+, hyaline casts of 2, WBC of 4, RBC of 4, epithelial cells of 2. Urine drug screen still positive for marijuana only. CT head shows no acute intracranial abnormality. At this point I did discuss with patient her lab results and CT results. At this point she will be discharged. She is to make sure she take her night dose of her Lamictal.  She wants to have a referral to a new neurology group. I will refer her to [facility] neurology group.   She will be

## 2023-03-26 NOTE — DISCHARGE INSTRUCTIONS
Sure you take your night dose of your Lamictal when you get home. Make sure you do not skip any of your medication dosage. Follow-up with [facility] neurology group call [phone] to schedule an appointment. Return emergency room for any worsening.

## 2023-03-27 LAB — LAMOTRIGINE SERPL-MCNC: 0.9 UG/ML (ref 3–15)

## 2024-01-16 ENCOUNTER — HOSPITAL ENCOUNTER (EMERGENCY)
Age: 29
Discharge: HOME | End: 2024-01-16
Attending: STUDENT IN AN ORGANIZED HEALTH CARE EDUCATION/TRAINING PROGRAM

## 2024-01-16 VITALS
DIASTOLIC BLOOD PRESSURE: 79 MMHG | RESPIRATION RATE: 17 BRPM | WEIGHT: 154.1 LBS | BODY MASS INDEX: 30.25 KG/M2 | SYSTOLIC BLOOD PRESSURE: 116 MMHG | TEMPERATURE: 99.3 F | OXYGEN SATURATION: 100 % | HEIGHT: 60 IN | HEART RATE: 77 BPM

## 2024-01-16 DIAGNOSIS — R56.9 SEIZURE (HCC): Primary | ICD-10-CM

## 2024-01-16 LAB
ALBUMIN SERPL-MCNC: 4.1 G/DL (ref 3.4–5)
ALBUMIN/GLOB SERPL: 1.3 {RATIO} (ref 1.1–2.2)
ALP SERPL-CCNC: 55 U/L (ref 40–129)
ALT SERPL-CCNC: 17 U/L (ref 10–40)
ANION GAP SERPL CALCULATED.3IONS-SCNC: 9 MMOL/L (ref 3–16)
AST SERPL-CCNC: 22 U/L (ref 15–37)
BASOPHILS # BLD: 0 K/UL (ref 0–0.2)
BASOPHILS NFR BLD: 0 %
BILIRUB SERPL-MCNC: <0.2 MG/DL (ref 0–1)
BUN SERPL-MCNC: 10 MG/DL (ref 7–20)
CALCIUM SERPL-MCNC: 8.6 MG/DL (ref 8.3–10.6)
CHLORIDE SERPL-SCNC: 105 MMOL/L (ref 99–110)
CO2 SERPL-SCNC: 25 MMOL/L (ref 21–32)
CREAT SERPL-MCNC: 0.5 MG/DL (ref 0.6–1.1)
DEPRECATED RDW RBC AUTO: 18.3 % (ref 12.4–15.4)
EOSINOPHIL # BLD: 0.2 K/UL (ref 0–0.6)
EOSINOPHIL NFR BLD: 4 %
GFR SERPLBLD CREATININE-BSD FMLA CKD-EPI: >60 ML/MIN/{1.73_M2}
GLUCOSE BLD-MCNC: 98 MG/DL (ref 70–99)
GLUCOSE SERPL-MCNC: 86 MG/DL (ref 70–99)
HCT VFR BLD AUTO: 32.1 % (ref 36–48)
HGB BLD-MCNC: 10 G/DL (ref 12–16)
LACTATE BLDV-SCNC: 1.3 MMOL/L (ref 0.4–2)
LYMPHOCYTES # BLD: 1.5 K/UL (ref 1–5.1)
LYMPHOCYTES NFR BLD: 24 %
MCH RBC QN AUTO: 22.3 PG (ref 26–34)
MCHC RBC AUTO-ENTMCNC: 31.1 G/DL (ref 31–36)
MCV RBC AUTO: 71.6 FL (ref 80–100)
MONOCYTES # BLD: 0.7 K/UL (ref 0–1.3)
MONOCYTES NFR BLD: 11 %
NEUTROPHILS # BLD: 3.8 K/UL (ref 1.7–7.7)
NEUTROPHILS NFR BLD: 61 %
PERFORMED ON: NORMAL
PLATELET # BLD AUTO: 214 K/UL (ref 135–450)
PMV BLD AUTO: 10.5 FL (ref 5–10.5)
POTASSIUM SERPL-SCNC: 3.9 MMOL/L (ref 3.5–5.1)
PROT SERPL-MCNC: 7.3 G/DL (ref 6.4–8.2)
RBC # BLD AUTO: 4.49 M/UL (ref 4–5.2)
SODIUM SERPL-SCNC: 139 MMOL/L (ref 136–145)
WBC # BLD AUTO: 6.2 K/UL (ref 4–11)

## 2024-01-16 PROCEDURE — 83605 ASSAY OF LACTIC ACID: CPT

## 2024-01-16 PROCEDURE — 80053 COMPREHEN METABOLIC PANEL: CPT

## 2024-01-16 PROCEDURE — 80175 DRUG SCREEN QUAN LAMOTRIGINE: CPT

## 2024-01-16 PROCEDURE — 96360 HYDRATION IV INFUSION INIT: CPT

## 2024-01-16 PROCEDURE — 2580000003 HC RX 258: Performed by: STUDENT IN AN ORGANIZED HEALTH CARE EDUCATION/TRAINING PROGRAM

## 2024-01-16 PROCEDURE — 96361 HYDRATE IV INFUSION ADD-ON: CPT

## 2024-01-16 PROCEDURE — 85025 COMPLETE CBC W/AUTO DIFF WBC: CPT

## 2024-01-16 PROCEDURE — 6370000000 HC RX 637 (ALT 250 FOR IP): Performed by: STUDENT IN AN ORGANIZED HEALTH CARE EDUCATION/TRAINING PROGRAM

## 2024-01-16 PROCEDURE — 99284 EMERGENCY DEPT VISIT MOD MDM: CPT

## 2024-01-16 RX ORDER — LAMOTRIGINE 100 MG/1
TABLET ORAL
Qty: 109 TABLET | Refills: 0 | Status: SHIPPED | OUTPATIENT
Start: 2024-01-16 | End: 2024-02-16

## 2024-01-16 RX ORDER — LAMOTRIGINE 100 MG/1
200 TABLET ORAL ONCE
Status: COMPLETED | OUTPATIENT
Start: 2024-01-16 | End: 2024-01-16

## 2024-01-16 RX ORDER — SODIUM CHLORIDE, SODIUM LACTATE, POTASSIUM CHLORIDE, AND CALCIUM CHLORIDE .6; .31; .03; .02 G/100ML; G/100ML; G/100ML; G/100ML
1000 INJECTION, SOLUTION INTRAVENOUS ONCE
Status: COMPLETED | OUTPATIENT
Start: 2024-01-16 | End: 2024-01-16

## 2024-01-16 RX ADMIN — SODIUM CHLORIDE, POTASSIUM CHLORIDE, SODIUM LACTATE AND CALCIUM CHLORIDE 1000 ML: 600; 310; 30; 20 INJECTION, SOLUTION INTRAVENOUS at 06:57

## 2024-01-16 RX ADMIN — LAMOTRIGINE 200 MG: 100 TABLET ORAL at 07:06

## 2024-01-16 ASSESSMENT — PAIN - FUNCTIONAL ASSESSMENT: PAIN_FUNCTIONAL_ASSESSMENT: 0-10

## 2024-01-16 ASSESSMENT — LIFESTYLE VARIABLES
HOW OFTEN DO YOU HAVE A DRINK CONTAINING ALCOHOL: NEVER
HOW MANY STANDARD DRINKS CONTAINING ALCOHOL DO YOU HAVE ON A TYPICAL DAY: PATIENT DOES NOT DRINK

## 2024-01-16 ASSESSMENT — PAIN SCALES - GENERAL: PAINLEVEL_OUTOF10: 0

## 2024-01-16 NOTE — ED PROVIDER NOTES
ED Attending Attestation Note    This patient was seen by the advanced practice provider.     I personally saw the patient and performed a substantive portion of the visit including all aspects of the medical decision making.     Briefly, 28 y.o. female presents for seizure.  Seizure occurred 30 minutes prior to ED arrival.  Seizure was witnessed by family member who is at bedside now.  Family member reports that patient went unresponsive and had a staring spell that lasted a couple seconds.  After patient's gaze palsy she was confused.  Family were also states that yesterday patient had a seizure that lasted a couple seconds as well.  Patient seizure yesterday was more stereotypical of seizure-like activity where she had a fixed gaze upward and upper extremity rigidity/extension.  Patient states she has been off her Lamictal since Saturday due to her running out of her prescription.  Patient states she has not followed up with neurology in years.  Patient states she has had a total of 4-5 seizures in 2023.  Patient states so far in 2024 she has had 2.    Focused exam:   Gen: awake, alert, and NAD  HEENT: NCAT. EOMI.   CV: RRR w/o MRG  Lungs: CTAB. No incr WOB.   Abdomen: Soft, nontender, nondistended. No rebound/guarding.   Neuro: Moving all extremities, fluent speech, follows commands, alert and oriented x 3.  5 out of 5 strength in upper and lower extremities.  Cranial nerves II through XII intact, global sensation intact to light touch.  Normal gait.    MDM:   Patient is hemodynamic stable upon arrival to the emergency department.  Patient is afebrile.      Differential diagnose includes but not limited to seizure, nonepileptic seizure, electrolyte abnormality such as hypoglycemia or hyponatremia, intoxication, metabolic encephalopathy, syncope.    Point-of-care glucose on arrival was 98.  IV access was established for patient.  On physical exam patient has no focal logic deficits and she is alert and oriented x

## 2024-01-16 NOTE — ED NOTES
Pt d/c home with AVS,  no s/s of distress noted, script x 1 given to patient   pt denies questions about follow up

## 2024-01-16 NOTE — ED PROVIDER NOTES
[facility] EMERGENCY DEPARTMENT  EMERGENCY DEPARTMENT ENCOUNTER        Pt Name: Candelaria Gamboa  MRN: [id number]  Birthdate 1995  Date of evaluation: 1/16/2024  Provider: Lidia Haynes PA-C  PCP: [facility]  Note Started: 7:31 AM EST 1/16/24       I have seen and evaluated this patient with my supervising physician Rc Alonso MD.      CHIEF COMPLAINT       Chief Complaint   Patient presents with    Seizures     Pt does have epilepsy diagnosis and has had some minor seizures per relative. 1 yesterday morning and 1 in her sleep. Pt was not responding to her name during seizure activity. This was a witnessed seizure. Seizure lasted approx 5-10 seconds. Pt walked into department. Alert and oriented        HISTORY OF PRESENT ILLNESS: 1 or more Elements             Candelaria Gamboa is a 28 y.o. female who presents to the emergency department after a witnessed seizures x 2.  Once yesterday and once this morning.  They last approximately 5 to 10 seconds.  Patient has not been taking her Lamictal as she has been out of it for the past week.  She did not fall or hit her head.  She has no complaints at time of arrival.  She denies any fevers chills, dizziness, lightheadedness.    Nursing Notes were all reviewed and agreed with or any disagreements were addressed in the HPI.    REVIEW OF SYSTEMS :      Review of Systems   All other systems reviewed and are negative.      Positives and Pertinent negatives as per HPI.     SURGICAL HISTORY   History reviewed. No pertinent surgical history.    CURRENTMEDICATIONS       Current Discharge Medication List        CONTINUE these medications which have NOT CHANGED    Details   fluticasone (FLONASE) 50 MCG/ACT nasal spray 1 spray by Each Nostril route daily  Qty: 1 Bottle, Refills: 0      folic acid (FOLVITE) 1 MG tablet Take 1 mg by mouth daily             ALLERGIES     Patient has no known allergies.    FAMILYHISTORY     History reviewed.

## 2024-01-16 NOTE — DISCHARGE INSTRUCTIONS
Please follow-up with neurology.  Please come back to the emergency department if symptoms worsen.

## 2024-01-17 LAB — LAMOTRIGINE SERPL-MCNC: <0.9 UG/ML (ref 3–15)
